# Patient Record
Sex: FEMALE | Race: WHITE | NOT HISPANIC OR LATINO | Employment: OTHER | URBAN - METROPOLITAN AREA
[De-identification: names, ages, dates, MRNs, and addresses within clinical notes are randomized per-mention and may not be internally consistent; named-entity substitution may affect disease eponyms.]

---

## 2024-09-19 ENCOUNTER — OFFICE VISIT (OUTPATIENT)
Dept: FAMILY MEDICINE | Facility: CLINIC | Age: 29
End: 2024-09-19
Payer: COMMERCIAL

## 2024-09-19 VITALS
HEIGHT: 67 IN | SYSTOLIC BLOOD PRESSURE: 106 MMHG | DIASTOLIC BLOOD PRESSURE: 72 MMHG | TEMPERATURE: 98.1 F | WEIGHT: 133 LBS | OXYGEN SATURATION: 100 % | RESPIRATION RATE: 10 BRPM | HEART RATE: 75 BPM | BODY MASS INDEX: 20.88 KG/M2

## 2024-09-19 DIAGNOSIS — B02.9 HERPES ZOSTER WITHOUT COMPLICATION: Primary | ICD-10-CM

## 2024-09-19 DIAGNOSIS — R21 RASH: ICD-10-CM

## 2024-09-19 PROCEDURE — 99213 OFFICE O/P EST LOW 20 MIN: CPT | Performed by: PHYSICIAN ASSISTANT

## 2024-09-19 RX ORDER — VALACYCLOVIR HYDROCHLORIDE 1 G/1
1000 TABLET, FILM COATED ORAL 3 TIMES DAILY
Qty: 21 TABLET | Refills: 0 | Status: SHIPPED | OUTPATIENT
Start: 2024-09-19 | End: 2024-09-26

## 2024-09-19 ASSESSMENT — PAIN SCALES - GENERAL: PAINLEVEL: MILD PAIN (2)

## 2024-09-19 NOTE — PROGRESS NOTES
Assessment & Plan     Herpes zoster without complication  Etiology likely shingles.  Other diagnoses discussed and considered are dyshidrotic eczema vs HFM vs other viral rash.  Due to lack of URI symptoms and significant arm pain which occurred before the rash, I suspect that this represents shingles and will treat as such. Return precautions given.  Pain managed with OCT medications discussed. Offered definitive testing but agreed that the utility in this case is limited and will not change initial management. She opted to forego skin culture at this time which is reasonable.   - valACYclovir (VALTREX) 1000 mg tablet; Take 1 tablet (1,000 mg) by mouth 3 times daily for 7 days.    Rash            See Patient Instructions    Subjective   Araceli is a 29 year old, presenting for the following health issues:  Derm Problem (Blisters on hands) and Arm Pain        9/19/2024     9:21 AM   Additional Questions   Roomed by Ly STRONG   Accompanied by Son     Arm Pain     Pt had onset intermittent sharp pain from right shoulder radiating down to right arm over the past 3 days. She has now developed blisters on hands. Shingles concern?       Rash  Onset/Duration: a few days ago  Description  Location: hands   Character: round, blotchy, raised, painful, red  Itching: no  Intensity:  moderate  Progression of Symptoms:  worsening  Accompanying signs and symptoms:   Fever: No  Body aches or joint pain: No  Sore throat symptoms: No  Recent cold symptoms: No  History:           Previous episodes of similar rash: None  New exposures:  None  Recent travel: No  Exposure to similar rash: No  Precipitating or alleviating factors: n/a  Therapies tried and outcome: none  Pain History:  When did you first notice your pain? A few days ago   Have you seen anyone else for your pain? No  How has your pain affected your ability to work? Not applicable  Where in your body do you have pain? Arm Pain   Onset/Duration:  a few days  "  Description  Location: arm - right  Joint Swelling: No  Redness: No  Pain: YES- sharp intermittent pain   Warmth: No  Intensity:  moderate  Progression of Symptoms:  intermittent  Accompanying signs and symptoms:   Fevers: No  Numbness/tingling/weakness: YES, down whole arm to hands  History  Trauma to the area: No  Recent illness:  No  Previous similar problem: No  Previous evaluation:  No  Precipitating or alleviating factors:  Aggravating factors include: lifting, overuse, and sleeping  Therapies tried and outcome: rest/inactivity and ice        Review of Systems  Constitutional, neuro, ENT, endocrine, pulmonary, cardiac, gastrointestinal, genitourinary, musculoskeletal, integument and psychiatric systems are negative, except as otherwise noted.      Objective    /72 (BP Location: Left arm, Patient Position: Sitting, Cuff Size: Adult Regular)   Pulse 75   Temp 98.1  F (36.7  C) (Tympanic)   Resp 10   Ht 1.69 m (5' 6.54\")   Wt 60.3 kg (133 lb)   LMP 09/04/2024 (Approximate)   SpO2 100%   BMI 21.12 kg/m    Body mass index is 21.12 kg/m .  Physical Exam   GENERAL: alert and no distress  SKIN:slightly erythematous papulovesicular lesions noted along palms and knuckles of right hand that are TTP            Signed Electronically by: Neo Jaquez PA-C    "

## 2025-02-17 ENCOUNTER — NURSE TRIAGE (OUTPATIENT)
Dept: FAMILY MEDICINE | Facility: CLINIC | Age: 30
End: 2025-02-17
Payer: COMMERCIAL

## 2025-02-17 NOTE — TELEPHONE ENCOUNTER
"Nurse Triage SBAR    Is this a 2nd Level Triage? YES, LICENSED PRACTITIONER REVIEW IS REQUIRED    Situation: Pt has had 2 episodes of severe epigastric within the last 2-3 weeks.     Background: Upper abdominal pain radiates to her back and has dry heaves. Pt denies pregnancy, vomiting or fever. She also denies chest pain or breathing problems.  Pt  describes pain as\" intense\" and is typically in the middle of night and pain last for 3 hours. Pt is asymptomatic during call. Pt is not established with [facility] but plans to establish care with a  provider.      Assessment: Patient needs to be evaluated.     Protocol Recommended Disposition:   See in Office Today     Recommendation: ADS huddle for appt tomorrow/ schedule establish care appt.      Does the patient meet one of the following criteria for ADS visit consideration? 16+ years old, with an [facility] PCP     TIP  Providers, please consider if this condition is appropriate for management at one of our Acute and Diagnostic Services sites.     If patient is a good candidate, please use dotphrase <dot>triageresponse and select Refer to ADS to document.          Reason for Disposition   Patient wants to be seen   MODERATE pain that comes and goes (cramps) lasts > 24 hours    Additional Information   Negative: SEVERE difficulty breathing (e.g., struggling for each breath, speaks in single words)   Negative: Shock suspected (e.g., cold/pale/clammy skin, too weak to stand, low BP, rapid pulse)   Negative: Difficult to awaken or acting confused (e.g., disoriented, slurred speech)   Negative: Passed out (e.g., fainted, lost consciousness, blacked out and was not responding)   Negative: Visible sweat on face or sweat is dripping down   Negative: Sounds like a life-threatening emergency to the triager   Negative: Followed an abdomen (stomach) injury   Negative: Chest pain   Negative: Abdominal pain and pregnant < 20 weeks   Negative: Abdominal pain and pregnant 20 or more " weeks   Negative: Abdomen bloating or swelling are main symptoms   Negative: SEVERE abdominal pain (e.g., excruciating)   Negative: Pain lasting > 10 minutes and over 50 years old   Negative: Pain lasting > 10 minutes and over 40 years old and associated chest, arm, neck, upper back, or jaw pain   Negative: Pain lasting > 10 minutes and over 35 years old and at least one cardiac risk factor (e.g., diabetes, high cholesterol, hypertension, obesity, smoker or strong family history of heart disease)   Negative: Pain lasting > 10 minutes and history of heart disease (i.e., heart attack, bypass surgery, angina, angioplasty, CHF)   Negative: Pain lasts > 10 minutes and difficulty breathing   Negative: Vomiting red blood or black (coffee ground) material  (Exception: Few streaks and only occurred once.)   Negative: Blood in bowel movements  (Exception: Blood on surface of BM with constipation.)   Negative: Black or tarry bowel movements  (Exception: Chronic-unchanged black-grey BMs AND is taking iron pills or Pepto-Bismol.)   Negative: Pregnant 20 weeks or more and new hand or face swelling   Negative: MILD TO MODERATE constant pain lasting > 2 hours   Negative: MILD TO MODERATE pain and not relieved by antacid medicine   Negative: Vomiting bile (green color)   Negative: Patient sounds very sick or weak to the triager   Negative: Vomiting and abdomen looks much more swollen than usual   Negative: White of the eyes have turned yellow (i.e., jaundice)   Negative: Fever > 103 F (39.4 C)   Negative: Fever > 101 F (38.3 C) and over 60 years of age   Negative: Fever > 100 F (37.8 C) and has diabetes mellitus or a weak immune system (e.g., HIV positive, cancer chemotherapy, organ transplant, splenectomy, chronic steroids)   Negative: Fever > 100 F (37.8 C) and bedridden (e.g., CVA, chronic illness, recovering from surgery)    Protocols used: Abdominal Pain - Upper-A-[place]

## 2025-02-17 NOTE — TELEPHONE ENCOUNTER
We were asked to consult on this patient case for acute diagnostic services    Patient presenting with epigastric discomfort , learns more towards right side but laying down reproduces discomfort; having muscle spasms intermittently throughout the night    She has not had any fevers.  No injuries to the area.  No pain with eating.  No history abdominal surgeries.    Denies any history of reflux or cardiac issues.  Denies any recent respiratory issues or cough.      Patient is moving primary care services from [facility] to our clinic due to an insurance change      At this present time she there are no clear indications she will need advanced imaging    I sent her an email enrollment for [facility] from there she can use the on my way system to be seen in urgent care if she chooses to be evaluated in person tonight.      No red flag symptoms identified at this time    [facility]

## 2025-02-20 ENCOUNTER — OFFICE VISIT (OUTPATIENT)
Dept: FAMILY MEDICINE | Facility: CLINIC | Age: 30
End: 2025-02-20
Payer: COMMERCIAL

## 2025-02-20 VITALS
BODY MASS INDEX: 21.98 KG/M2 | OXYGEN SATURATION: 100 % | RESPIRATION RATE: 14 BRPM | HEIGHT: 66 IN | TEMPERATURE: 97.4 F | WEIGHT: 136.8 LBS | HEART RATE: 72 BPM | SYSTOLIC BLOOD PRESSURE: 102 MMHG | DIASTOLIC BLOOD PRESSURE: 72 MMHG

## 2025-02-20 DIAGNOSIS — R11.2 NAUSEA AND VOMITING, UNSPECIFIED VOMITING TYPE: ICD-10-CM

## 2025-02-20 DIAGNOSIS — Z00.00 ROUTINE GENERAL MEDICAL EXAMINATION AT A HEALTH CARE FACILITY: Primary | ICD-10-CM

## 2025-02-20 DIAGNOSIS — F41.1 GAD (GENERALIZED ANXIETY DISORDER): ICD-10-CM

## 2025-02-20 DIAGNOSIS — J45.20 MILD INTERMITTENT ASTHMA WITHOUT COMPLICATION: ICD-10-CM

## 2025-02-20 DIAGNOSIS — Z13.220 ENCOUNTER FOR SCREENING FOR LIPID DISORDER: ICD-10-CM

## 2025-02-20 DIAGNOSIS — K21.9 GASTROESOPHAGEAL REFLUX DISEASE, UNSPECIFIED WHETHER ESOPHAGITIS PRESENT: ICD-10-CM

## 2025-02-20 DIAGNOSIS — R10.13 ABDOMINAL PAIN, EPIGASTRIC: ICD-10-CM

## 2025-02-20 LAB
ALBUMIN SERPL BCG-MCNC: 4.4 G/DL (ref 3.5–5.2)
ALP SERPL-CCNC: 66 U/L (ref 40–150)
ALT SERPL W P-5'-P-CCNC: 19 U/L (ref 0–50)
ANION GAP SERPL CALCULATED.3IONS-SCNC: 14 MMOL/L (ref 7–15)
AST SERPL W P-5'-P-CCNC: 23 U/L (ref 0–45)
BILIRUB SERPL-MCNC: 0.4 MG/DL
BUN SERPL-MCNC: 13.8 MG/DL (ref 6–20)
CALCIUM SERPL-MCNC: 9.7 MG/DL (ref 8.8–10.4)
CHLORIDE SERPL-SCNC: 103 MMOL/L (ref 98–107)
CHOLEST SERPL-MCNC: 180 MG/DL
CREAT SERPL-MCNC: 0.62 MG/DL (ref 0.51–0.95)
EGFRCR SERPLBLD CKD-EPI 2021: >90 ML/MIN/1.73M2
ERYTHROCYTE [DISTWIDTH] IN BLOOD BY AUTOMATED COUNT: 12.9 % (ref 10–15)
FASTING STATUS PATIENT QL REPORTED: ABNORMAL
FASTING STATUS PATIENT QL REPORTED: NORMAL
GLUCOSE SERPL-MCNC: 90 MG/DL (ref 70–99)
HCO3 SERPL-SCNC: 22 MMOL/L (ref 22–29)
HCT VFR BLD AUTO: 41.8 % (ref 35–47)
HDLC SERPL-MCNC: 61 MG/DL
HGB BLD-MCNC: 14 G/DL (ref 11.7–15.7)
LDLC SERPL CALC-MCNC: 108 MG/DL
LIPASE SERPL-CCNC: 24 U/L (ref 13–60)
MCH RBC QN AUTO: 29.4 PG (ref 26.5–33)
MCHC RBC AUTO-ENTMCNC: 33.5 G/DL (ref 31.5–36.5)
MCV RBC AUTO: 88 FL (ref 78–100)
NONHDLC SERPL-MCNC: 119 MG/DL
PLATELET # BLD AUTO: 224 10E3/UL (ref 150–450)
POTASSIUM SERPL-SCNC: 4.3 MMOL/L (ref 3.4–5.3)
PROT SERPL-MCNC: 7.8 G/DL (ref 6.4–8.3)
RBC # BLD AUTO: 4.76 10E6/UL (ref 3.8–5.2)
SODIUM SERPL-SCNC: 139 MMOL/L (ref 135–145)
TRIGL SERPL-MCNC: 53 MG/DL
TSH SERPL DL<=0.005 MIU/L-ACNC: 2.78 UIU/ML (ref 0.3–4.2)
WBC # BLD AUTO: 6.4 10E3/UL (ref 4–11)

## 2025-02-20 RX ORDER — ALBUTEROL SULFATE 90 UG/1
2 INHALANT RESPIRATORY (INHALATION) EVERY 6 HOURS PRN
Qty: 18 G | Refills: 1 | Status: SHIPPED | OUTPATIENT
Start: 2025-02-20

## 2025-02-20 RX ORDER — OMEPRAZOLE 20 MG/1
20 CAPSULE, DELAYED RELEASE ORAL DAILY
Qty: 90 CAPSULE | Refills: 1 | Status: SHIPPED | OUTPATIENT
Start: 2025-02-20

## 2025-02-20 RX ORDER — ONDANSETRON 4 MG/1
4 TABLET, FILM COATED ORAL EVERY 8 HOURS PRN
Qty: 30 TABLET | Refills: 5 | Status: SHIPPED | OUTPATIENT
Start: 2025-02-20

## 2025-02-20 SDOH — HEALTH STABILITY: PHYSICAL HEALTH: ON AVERAGE, HOW MANY DAYS PER WEEK DO YOU ENGAGE IN MODERATE TO STRENUOUS EXERCISE (LIKE A BRISK WALK)?: 5 DAYS

## 2025-02-20 SDOH — HEALTH STABILITY: PHYSICAL HEALTH: ON AVERAGE, HOW MANY MINUTES DO YOU ENGAGE IN EXERCISE AT THIS LEVEL?: 30 MIN

## 2025-02-20 ASSESSMENT — ENCOUNTER SYMPTOMS: ABDOMINAL PAIN: 1

## 2025-02-20 ASSESSMENT — PAIN SCALES - GENERAL: PAINLEVEL_OUTOF10: NO PAIN (0)

## 2025-02-20 ASSESSMENT — ASTHMA QUESTIONNAIRES: ACT_TOTALSCORE: 25

## 2025-02-20 ASSESSMENT — SOCIAL DETERMINANTS OF HEALTH (SDOH): HOW OFTEN DO YOU GET TOGETHER WITH FRIENDS OR RELATIVES?: THREE TIMES A WEEK

## 2025-02-20 NOTE — PROGRESS NOTES
Preventive Care Visit  [facility] [place] Paez MD, Internal Medicine  Feb 20, 2025        Assessment and Plan  1. Routine general medical examination at a health care facility (Primary)    Patient is new to me, last seen our group in September 2024 for herpes.  She is here for acute concerns of abdominal pain with intermittent stomachache for the past 3 weeks does have nausea and vomiting as she endorses.  Not on any medications on chart review, I do not see any abdominal imaging except past OB/GYN ultrasound.  She is opting to have this as annual physical today, will do as requested.    - REVIEW OF HEALTH MAINTENANCE PROTOCOL ORDERS  - Lipase; Future  - Comprehensive metabolic panel (BMP + Alb, Alk Phos, ALT, AST, Total. Bili, TP); Future  - TSH with free T4 reflex; Future  - CBC with platelets; Future  - Lipid panel reflex to direct LDL Fasting; Future  - PRIMARY CARE FOLLOW-UP SCHEDULING; Future  - Comprehensive metabolic panel (BMP + Alb, Alk Phos, ALT, AST, Total. Bili, TP)  - TSH with free T4 reflex  - CBC with platelets  - Lipid panel reflex to direct LDL Fasting    2. Gastroesophageal reflux disease, unspecified whether esophagitis present  New problem added to patient on list today as per the symptoms mentioned below with the physical exam most likely GERD.  Will treat empirically with omeprazole and check for H. pylori.  - omeprazole (PRILOSEC) 20 MG DR capsule; Take 1 capsule (20 mg) by mouth daily.  Dispense: 90 capsule; Refill: 1  - Helicobacter pylori Antigen Stool; Future  - Helicobacter pylori Antigen Stool    3. Abdominal pain, epigastric  Acute concerns of ongoing abdominal pain for past 3 weeks as mentioned in HPI.  Patient endorses that this has been as 3 episodes in last 3 weeks >> last episode of stomach pain yesterday night more of dry heaves >. Happening at night. Stays 3-4 hours for 7-10/10 in severity. Denies any loose stools/blood in stools - as well as  ,not tried any OTC antacids in past 3 weeks.   - Physical exam positive for discomfort on palpation of Epigastrium and hypoactive bowel sounds. Though no rigidity or guarding. Pt states her current abdominal pain is 2/10 and no nausea. She has tolerated her breakfast today well.   - Most part of the appointment have been discussing on the CT abdomen STAT needs at this time , explained ER precautions and shared decision to so the baseline work up which she did not have in the past and treat this as GERD as mentioned above.   - Will await for the lab results and pt to update me for any worsening symptoms as mentioned in AVS below.   - Lipase; Future  - Comprehensive metabolic panel (BMP + Alb, Alk Phos, ALT, AST, Total. Bili, TP); Future  - CBC with platelets; Future  - omeprazole (PRILOSEC) 20 MG DR capsule; Take 1 capsule (20 mg) by mouth daily.  Dispense: 90 capsule; Refill: 1  - Lipase  - Comprehensive metabolic panel (BMP + Alb, Alk Phos, ALT, AST, Total. Bili, TP)  - CBC with platelets    4. Nausea and vomiting, unspecified vomiting type  New problem which patient endorses has been bothering her as mentioned in the HPI below with sudden sharp intermittent abdominal pain as mentioned above.  Differential diagnosis of possible pancreatitis versus GERD, will give symptomatic therapy and do pertinent workup for this.  - Lipase; Future  - ondansetron (ZOFRAN) 4 MG tablet; Take 1 tablet (4 mg) by mouth every 8 hours as needed for nausea.  Dispense: 30 tablet; Refill: 5    5. VA (generalized anxiety disorder)  Chronic stable, intermittent flareups.  Not on any medications at this time, continue current lifestyle modifications.  Will check TSH and do further recommendations.  - TSH with free T4 reflex; Future  - TSH with free T4 reflex    6. Mild intermittent asthma without complication  - CBC with platelets; Future  - CBC with platelets  - albuterol (PROAIR HFA/PROVENTIL HFA/VENTOLIN HFA) 108 (90 Base) MCG/ACT  inhaler; Inhale 2 puffs into the lungs every 6 hours as needed for shortness of breath, wheezing or cough.  Dispense: 18 g; Refill: 1    7. Encounter for screening for lipid disorder  - Lipid panel reflex to direct LDL Fasting; Future  - Lipid panel reflex to direct LDL Fasting         Please note that this note consists of symbols derived from keyboarding, dictation and/or voice recognition software. As a result, there may be errors in the script that have gone undetected. Please consider this when interpreting information found in this chart.    Patient Instructions   As discussed, since your symptoms are pretty well controlled and happening only in nights - will make sure its not GERD    Will check for your pertinent lab work to make sure no pancreatic inflammation / gall bladder or liver issues causing this.     Started on omeprazole and as needed nausea medication for you     Please inform me immediately if any worsening abdominal pain - can plan     =========================================      Patient Education  Preventive Care Advice   This is general advice given by our system to help you stay healthy. However, your care team may have specific advice just for you. Please talk to your care team about your preventive care needs.  Nutrition  Eat 5 or more servings of fruits and vegetables each day.  Try wheat bread, brown rice and whole grain pasta (instead of white bread, rice, and pasta).  Get enough calcium and vitamin D. Check the label on foods and aim for 100% of the RDA (recommended daily allowance).  Lifestyle  Exercise at least 150 minutes each week  (30 minutes a day, 5 days a week).  Do muscle strengthening activities 2 days a week. These help control your weight and prevent disease.  No smoking.  Wear sunscreen to prevent skin cancer.  Have a dental exam and cleaning every 6 months.  Yearly exams  See your health care team every year to talk about:  Any changes in your health.  Any medicines your  care team has prescribed.  Preventive care, family planning, and ways to prevent chronic diseases.  Shots (vaccines)   HPV shots (up to age 26), if you've never had them before.  Hepatitis B shots (up to age 59), if you've never had them before.  COVID-19 shot: Get this shot when it's due.  Flu shot: Get a flu shot every year.  Tetanus shot: Get a tetanus shot every 10 years.  Pneumococcal, hepatitis A, and RSV shots: Ask your care team if you need these based on your risk.  Shingles shot (for age 50 and up)  General health tests  Diabetes screening:  Starting at age 35, Get screened for diabetes at least every 3 years.  If you are younger than age 35, ask your care team if you should be screened for diabetes.  Cholesterol test: At age 39, start having a cholesterol test every 5 years, or more often if advised.  Bone density scan (DEXA): At age 50, ask your care team if you should have this scan for osteoporosis (brittle bones).  Hepatitis C: Get tested at least once in your life.  STIs (sexually transmitted infections)  Before age 24: Ask your care team if you should be screened for STIs.  After age 24: Get screened for STIs if you're at risk. You are at risk for STIs (including HIV) if:  You are sexually active with more than one person.  You don't use condoms every time.  You or a partner was diagnosed with a sexually transmitted infection.  If you are at risk for HIV, ask about PrEP medicine to prevent HIV.  Get tested for HIV at least once in your life, whether you are at risk for HIV or not.  Cancer screening tests  Cervical cancer screening: If you have a cervix, begin getting regular cervical cancer screening tests starting at age 21.  Breast cancer scan (mammogram): If you've ever had breasts, begin having regular mammograms starting at age 40. This is a scan to check for breast cancer.  Colon cancer screening: It is important to start screening for colon cancer at age 45.  Have a colonoscopy test every 10  years (or more often if you're at risk) Or, ask your provider about stool tests like a FIT test every year or Cologuard test every 3 years.  To learn more about your testing options, visit:   .  For help making a decision, visit:   https://bit.ly/ez36786.  Prostate cancer screening test: If you have a prostate, ask your care team if a prostate cancer screening test (PSA) at age 55 is right for you.  Lung cancer screening: If you are a current or former smoker ages 50 to 80, ask your care team if ongoing lung cancer screenings are right for you.  For informational purposes only. Not to replace the advice of your health care provider. Copyright   2023 [employer]. All rights reserved. Clinically reviewed by the [facility]. [employer] [id number] - REV 01/24.     Return in about 2 months (around 4/20/2025), or if symptoms worsen or fail to improve, for If symptoms persist, Follow up of last visit, video visit.    Blessing Paez MD  [facility] [place] Charles is a 29 year old, presenting for the following:  Abdominal Pain (Lasts 3-4 hours, only at night, pain feels like giving birth, radiates to the back,has not tried pain management. ) and Physical        2/20/2025     9:40 AM   Additional Questions   Roomed by Arabella PAULSON          History of Present Illness       Reason for visit:  Intermittent stomach and back pain  Symptom onset:  3-4 weeks ago   She is taking medications regularly.    GERD/Stomach pain- Epigastric region   Onset/Duration: 3 weeks  Description: excruciating pain only at night  Intensity: severe  Progression of Symptoms: waxing and waning  Accompanying Signs & Symptoms:  Does it feel like food gets stuck or trouble swallowing: No  Nausea: YES  Vomiting : YES- bile vomiting   Abdominal Pain: YES  Black-Tarry stools: No  Bloody stools: No  History:  Previous similar episodes: No  Previous ulcers: No  Precipitating factors:    Caffeine use: YES  Alcohol use: N/A  NSAID/Aspirin use: No  Tobacco use: N/A  Worse with fatty foods and spicy foods.  Alleviating factors: Bedtime  Therapies tried and outcome:             Lifestyle changes: Eating healthy- no relief            Medications: none      Health Care Directive  Patient does not have a Health Care Directive: Discussed advance care planning with patient; however, patient declined at this time.      2/20/2025   General Health   How would you rate your overall physical health? Good   Feel stress (tense, anxious, or unable to sleep) Not at all         2/20/2025   Nutrition   Three or more servings of calcium each day? Yes   Diet: Regular (no restrictions)   How many servings of fruit and vegetables per day? (!) 2-3   How many sweetened beverages each day? 0-1         2/20/2025   Exercise   Days per week of moderate/strenous exercise 5 days   Average minutes spent exercising at this level 30 min         2/20/2025   Social Factors   Frequency of gathering with friends or relatives Three times a week   Worry food won't last until get money to buy more No   Food not last or not have enough money for food? No   Do you have housing? (Housing is defined as stable permanent housing and does not include staying ouside in a car, in a tent, in an abandoned building, in an overnight shelter, or couch-surfing.) Yes   Are you worried about losing your housing? No   Lack of transportation? No   Unable to get utilities (heat,electricity)? No         2/20/2025   Dental   Dentist two times every year? Yes            Today's PHQ-2 Score:       2/20/2025     9:28 AM   PHQ-2 ( 1999 [employer])   Q1: Little interest or pleasure in doing things 0   Q2: Feeling down, depressed or hopeless 0   PHQ-2 Score 0    Q1: Little interest or pleasure in doing things Not at all   Q2: Feeling down, depressed or hopeless Not at all   PHQ-2 Score 0       Patient-reported           2/20/2025   Substance Use   Alcohol more than  3/day or more than 7/wk No   Do you use any other substances recreationally? No     Social History     Tobacco Use    Smoking status: Never    Smokeless tobacco: Never   Vaping Use    Vaping status: Never Used          Mammogram Screening - Patient under 40 years of age: Routine Mammogram Screening not recommended.         2/20/2025   STI Screening   New sexual partner(s) since last STI/HIV test? No     History of abnormal Pap smear: No - age 30- 64 PAP with HPV every 5 years recommended             2/20/2025   Contraception/Family Planning   Questions about contraception or family planning No        Reviewed and updated as needed this visit by Provider   Tobacco  Allergies  Meds  Problems  Med Hx  Surg Hx  Fam Hx            History reviewed. No pertinent past medical history.  History reviewed. No pertinent surgical history.  OB History   No obstetric history on file.     Lab work is in process  Labs reviewed in EPIC  BP Readings from Last 3 Encounters:   02/20/25 102/72   09/19/24 106/72    Wt Readings from Last 3 Encounters:   02/20/25 62.1 kg (136 lb 12.8 oz)   09/19/24 60.3 kg (133 lb)                  Patient Active Problem List   Diagnosis    Asthma    VA (generalized anxiety disorder)     History reviewed. No pertinent surgical history.    Social History     Tobacco Use    Smoking status: Never    Smokeless tobacco: Never   Substance Use Topics    Alcohol use: Not on file     History reviewed. No pertinent family history.      Current Outpatient Medications   Medication Sig Dispense Refill    albuterol (PROAIR HFA/PROVENTIL HFA/VENTOLIN HFA) 108 (90 Base) MCG/ACT inhaler Inhale 2 puffs into the lungs every 6 hours as needed for shortness of breath, wheezing or cough. 18 g 1    omeprazole (PRILOSEC) 20 MG DR capsule Take 1 capsule (20 mg) by mouth daily. 90 capsule 1    ondansetron (ZOFRAN) 4 MG tablet Take 1 tablet (4 mg) by mouth every 8 hours as needed for nausea. 30 tablet 5     Allergies  "  Allergen Reactions    Penicillins Rash     No lab results found.       Review of Systems  Constitutional, HEENT, cardiovascular, pulmonary, GI, , musculoskeletal, neuro, skin, endocrine and psych systems are negative, except as otherwise noted.     Objective    Exam  /72   Pulse 72   Temp 97.4  F (36.3  C) (Temporal)   Resp 14   Ht 1.675 m (5' 5.95\")   Wt 62.1 kg (136 lb 12.8 oz)   LMP 01/25/2025 (Exact Date)   SpO2 100%   Breastfeeding No   BMI 22.12 kg/m     Estimated body mass index is 22.12 kg/m  as calculated from the following:    Height as of this encounter: 1.675 m (5' 5.95\").    Weight as of this encounter: 62.1 kg (136 lb 12.8 oz).    Physical Exam  GENERAL: alert and no distress  EYES: Eyes grossly normal to inspection, PERRL and conjunctivae and sclerae normal  HENT: ear canals and TM's normal, nose and mouth without ulcers or lesions  NECK: no adenopathy, no asymmetry, masses, or scars  RESP: lungs clear to auscultation - no rales, rhonchi or wheezes  BREAST: normal without masses, tenderness or nipple discharge and no palpable axillary masses or adenopathy  CV: regular rate and rhythm, normal S1 S2, no S3 or S4, no murmur, click or rub, no peripheral edema  ABDOMEN: soft, positive for discomfort on palpation of Epigastrium and hypoactive bowel sounds. Though no rigidity or guarding, no hepatosplenomegaly, no masses.  MS: no gross musculoskeletal defects noted, no edema  SKIN: no suspicious lesions or rashes  NEURO: Normal strength and tone, mentation intact and speech normal  PSYCH: mentation appears normal, affect normal/bright        Signed Electronically by: Blessing Paez MD    "

## 2025-02-20 NOTE — PATIENT INSTRUCTIONS
As discussed, since your symptoms are pretty well controlled and happening only in nights - will make sure its not GERD    Will check for your pertinent lab work to make sure no pancreatic inflammation / gall bladder or liver issues causing this.     Started on omeprazole and as needed nausea medication for you     Please inform me immediately if any worsening abdominal pain - can plan     =========================================      Patient Education   Preventive Care Advice   This is general advice given by our system to help you stay healthy. However, your care team may have specific advice just for you. Please talk to your care team about your preventive care needs.  Nutrition  Eat 5 or more servings of fruits and vegetables each day.  Try wheat bread, brown rice and whole grain pasta (instead of white bread, rice, and pasta).  Get enough calcium and vitamin D. Check the label on foods and aim for 100% of the RDA (recommended daily allowance).  Lifestyle  Exercise at least 150 minutes each week  (30 minutes a day, 5 days a week).  Do muscle strengthening activities 2 days a week. These help control your weight and prevent disease.  No smoking.  Wear sunscreen to prevent skin cancer.  Have a dental exam and cleaning every 6 months.  Yearly exams  See your health care team every year to talk about:  Any changes in your health.  Any medicines your care team has prescribed.  Preventive care, family planning, and ways to prevent chronic diseases.  Shots (vaccines)   HPV shots (up to age 26), if you've never had them before.  Hepatitis B shots (up to age 59), if you've never had them before.  COVID-19 shot: Get this shot when it's due.  Flu shot: Get a flu shot every year.  Tetanus shot: Get a tetanus shot every 10 years.  Pneumococcal, hepatitis A, and RSV shots: Ask your care team if you need these based on your risk.  Shingles shot (for age 50 and up)  General health tests  Diabetes screening:  Starting at age  35, Get screened for diabetes at least every 3 years.  If you are younger than age 35, ask your care team if you should be screened for diabetes.  Cholesterol test: At age 39, start having a cholesterol test every 5 years, or more often if advised.  Bone density scan (DEXA): At age 50, ask your care team if you should have this scan for osteoporosis (brittle bones).  Hepatitis C: Get tested at least once in your life.  STIs (sexually transmitted infections)  Before age 24: Ask your care team if you should be screened for STIs.  After age 24: Get screened for STIs if you're at risk. You are at risk for STIs (including HIV) if:  You are sexually active with more than one person.  You don't use condoms every time.  You or a partner was diagnosed with a sexually transmitted infection.  If you are at risk for HIV, ask about PrEP medicine to prevent HIV.  Get tested for HIV at least once in your life, whether you are at risk for HIV or not.  Cancer screening tests  Cervical cancer screening: If you have a cervix, begin getting regular cervical cancer screening tests starting at age 21.  Breast cancer scan (mammogram): If you've ever had breasts, begin having regular mammograms starting at age 40. This is a scan to check for breast cancer.  Colon cancer screening: It is important to start screening for colon cancer at age 45.  Have a colonoscopy test every 10 years (or more often if you're at risk) Or, ask your provider about stool tests like a FIT test every year or Cologuard test every 3 years.  To learn more about your testing options, visit:   .  For help making a decision, visit:   https://bit.ly/ol98578.  Prostate cancer screening test: If you have a prostate, ask your care team if a prostate cancer screening test (PSA) at age 55 is right for you.  Lung cancer screening: If you are a current or former smoker ages 50 to 80, ask your care team if ongoing lung cancer screenings are right for you.  For informational  purposes only. Not to replace the advice of your health care provider. Copyright   2023 [facility]. All rights reserved. Clinically reviewed by the [facility]. [employer] 122882 - REV 01/24.

## 2025-02-20 NOTE — PROGRESS NOTES
"  {PROVIDER CHARTING PREFERENCE:652872}    Tyson Charles is a 29 year old, presenting for the following health issues:  Abdominal Pain (Lasts 3-4 hours, only at night, pain feels like giving birth, radiates to the back,has not tried pain management. )        2/20/2025     9:40 AM   Additional Questions   Roomed by Arabella PAULSON     Abdominal Pain   This is a new problem. The problem occurs every several days. The pain is located in the epigastric region. The quality of the pain is dull.   History of Present Illness       Reason for visit:  Intermittent stomach and back pain  Symptom onset:  3-4 weeks ago   She is taking medications regularly.       {MA/LPN/RN Pre-Provider Visit Orders- hCG/UA/Strep (Optional):439161}  GERD/Stomach pain  Onset/Duration: 3 weeks  Description: excruciating pain only at night  Intensity: severe  Progression of Symptoms: waxing and waning  Accompanying Signs & Symptoms:  Does it feel like food gets stuck or trouble swallowing: No  Nausea: YES  Vomiting : YES- bile vomiting   Abdominal Pain: YES  Black-Tarry stools: No  Bloody stools: No  History:  Previous similar episodes: No  Previous ulcers: No  Precipitating factors:   Caffeine use: YES  Alcohol use: N/A  NSAID/Aspirin use: No  Tobacco use: N/A  Worse with fatty foods and spicy foods.  Alleviating factors: Bedtime  Therapies tried and outcome:             Lifestyle changes: Eating healthy- no relief            Medications: none  {additonal problems for provider to add (Optional):054406}    {ROS Picklists (Optional):038556}      Objective    /72   Pulse 72   Temp 97.4  F (36.3  C) (Temporal)   Resp 14   Ht 1.675 m (5' 5.95\")   Wt 62.1 kg (136 lb 12.8 oz)   LMP 01/25/2025 (Exact Date)   SpO2 100%   Breastfeeding No   BMI 22.12 kg/m    Body mass index is 22.12 kg/m .  Physical Exam   {Exam List (Optional):265576}    {Diagnostic Test Results (Optional):204967}        Signed Electronically by: Blessing Paez MD  {Email " feedback regarding this note to [email or web address]   :[id number]}

## 2025-02-21 ENCOUNTER — MYC MEDICAL ADVICE (OUTPATIENT)
Dept: FAMILY MEDICINE | Facility: CLINIC | Age: 30
End: 2025-02-21

## 2025-02-24 LAB — H PYLORI AG STL QL IA: NEGATIVE

## 2025-03-18 ENCOUNTER — MYC MEDICAL ADVICE (OUTPATIENT)
Dept: FAMILY MEDICINE | Facility: CLINIC | Age: 30
End: 2025-03-18
Payer: COMMERCIAL

## 2025-03-19 NOTE — TELEPHONE ENCOUNTER
Provider, please read the patient My Chart message and advise the triage staff.     Nancy Johnson RN  [facility]

## 2025-04-17 ENCOUNTER — ANCILLARY PROCEDURE (OUTPATIENT)
Dept: CT IMAGING | Facility: CLINIC | Age: 30
End: 2025-04-17
Attending: PHYSICIAN ASSISTANT
Payer: COMMERCIAL

## 2025-04-17 ENCOUNTER — NURSE TRIAGE (OUTPATIENT)
Dept: PEDIATRICS | Facility: CLINIC | Age: 30
End: 2025-04-17
Payer: COMMERCIAL

## 2025-04-17 ENCOUNTER — OFFICE VISIT (OUTPATIENT)
Dept: PEDIATRICS | Facility: CLINIC | Age: 30
End: 2025-04-17
Payer: COMMERCIAL

## 2025-04-17 VITALS
RESPIRATION RATE: 19 BRPM | SYSTOLIC BLOOD PRESSURE: 100 MMHG | BODY MASS INDEX: 21.38 KG/M2 | WEIGHT: 133 LBS | HEART RATE: 89 BPM | TEMPERATURE: 98.8 F | DIASTOLIC BLOOD PRESSURE: 65 MMHG | HEIGHT: 66 IN

## 2025-04-17 DIAGNOSIS — R11.2 NAUSEA AND VOMITING, UNSPECIFIED VOMITING TYPE: ICD-10-CM

## 2025-04-17 DIAGNOSIS — R10.13 EPIGASTRIC PAIN: Primary | ICD-10-CM

## 2025-04-17 DIAGNOSIS — R10.13 EPIGASTRIC PAIN: ICD-10-CM

## 2025-04-17 LAB
ALBUMIN SERPL-MCNC: 4.1 G/DL (ref 3.4–5)
ALBUMIN UR-MCNC: 30 MG/DL
ALP SERPL-CCNC: 61 U/L (ref 40–150)
ALT SERPL W P-5'-P-CCNC: 17 U/L (ref 0–50)
ANION GAP SERPL CALCULATED.3IONS-SCNC: <1 MMOL/L (ref 3–14)
APPEARANCE UR: CLEAR
AST SERPL W P-5'-P-CCNC: 28 U/L (ref 0–45)
BASOPHILS # BLD AUTO: 0 10E3/UL (ref 0–0.2)
BASOPHILS NFR BLD AUTO: 0 %
BILIRUB SERPL-MCNC: 0.9 MG/DL (ref 0.2–1.3)
BILIRUB UR QL STRIP: NEGATIVE
BUN SERPL-MCNC: 17 MG/DL (ref 7–30)
CALCIUM SERPL-MCNC: 9.5 MG/DL (ref 8.5–10.1)
CHLORIDE BLD-SCNC: 114 MMOL/L (ref 94–109)
CO2 SERPL-SCNC: 25 MMOL/L (ref 20–32)
COLOR UR AUTO: YELLOW
CREAT SERPL-MCNC: 0.6 MG/DL (ref 0.52–1.04)
EGFRCR SERPLBLD CKD-EPI 2021: >90 ML/MIN/1.73M2
EOSINOPHIL # BLD AUTO: 0 10E3/UL (ref 0–0.7)
EOSINOPHIL NFR BLD AUTO: 0 %
ERYTHROCYTE [DISTWIDTH] IN BLOOD BY AUTOMATED COUNT: 13.1 % (ref 10–15)
GLUCOSE BLD-MCNC: 105 MG/DL (ref 70–99)
GLUCOSE UR STRIP-MCNC: NEGATIVE MG/DL
HCG UR QL: NEGATIVE
HCT VFR BLD AUTO: 44.3 % (ref 35–47)
HGB BLD-MCNC: 14.3 G/DL (ref 11.7–15.7)
HGB UR QL STRIP: NEGATIVE
IMM GRANULOCYTES # BLD: 0 10E3/UL
IMM GRANULOCYTES NFR BLD: 0 %
KETONES UR STRIP-MCNC: NEGATIVE MG/DL
LEUKOCYTE ESTERASE UR QL STRIP: NEGATIVE
LIPASE SERPL-CCNC: 19 U/L (ref 13–60)
LYMPHOCYTES # BLD AUTO: 0.6 10E3/UL (ref 0.8–5.3)
LYMPHOCYTES NFR BLD AUTO: 6 %
MCH RBC QN AUTO: 28.5 PG (ref 26.5–33)
MCHC RBC AUTO-ENTMCNC: 32.3 G/DL (ref 31.5–36.5)
MCV RBC AUTO: 88 FL (ref 78–100)
MONOCYTES # BLD AUTO: 0.4 10E3/UL (ref 0–1.3)
MONOCYTES NFR BLD AUTO: 4 %
NEUTROPHILS # BLD AUTO: 8.5 10E3/UL (ref 1.6–8.3)
NEUTROPHILS NFR BLD AUTO: 90 %
NITRATE UR QL: NEGATIVE
PH UR STRIP: 7 [PH] (ref 5–7)
PLATELET # BLD AUTO: 218 10E3/UL (ref 150–450)
POTASSIUM BLD-SCNC: 4.3 MMOL/L (ref 3.4–5.3)
PROT SERPL-MCNC: 7.8 G/DL (ref 6.8–8.8)
RBC # BLD AUTO: 5.02 10E6/UL (ref 3.8–5.2)
RBC #/AREA URNS AUTO: NORMAL /HPF
SODIUM SERPL-SCNC: 139 MMOL/L (ref 135–145)
SP GR UR STRIP: 1.02 (ref 1–1.03)
UROBILINOGEN UR STRIP-ACNC: 0.2 E.U./DL
WBC # BLD AUTO: 9.5 10E3/UL (ref 4–11)
WBC #/AREA URNS AUTO: NORMAL /HPF

## 2025-04-17 PROCEDURE — 250N000011 HC RX IP 250 OP 636: Performed by: PHYSICIAN ASSISTANT

## 2025-04-17 PROCEDURE — 74177 CT ABD & PELVIS W/CONTRAST: CPT

## 2025-04-17 PROCEDURE — 250N000009 HC RX 250: Performed by: PHYSICIAN ASSISTANT

## 2025-04-17 RX ORDER — IOPAMIDOL 755 MG/ML
64 INJECTION, SOLUTION INTRAVASCULAR ONCE
Status: COMPLETED | OUTPATIENT
Start: 2025-04-17 | End: 2025-04-17

## 2025-04-17 RX ORDER — ONDANSETRON 2 MG/ML
4 INJECTION INTRAMUSCULAR; INTRAVENOUS
Status: ACTIVE | OUTPATIENT
Start: 2025-04-17 | End: 2025-04-18

## 2025-04-17 RX ORDER — SUCRALFATE 1 G/1
1 TABLET ORAL 4 TIMES DAILY
Qty: 40 TABLET | Refills: 1 | Status: SHIPPED | OUTPATIENT
Start: 2025-04-17

## 2025-04-17 RX ORDER — ONDANSETRON 4 MG/1
4 TABLET, ORALLY DISINTEGRATING ORAL EVERY 8 HOURS PRN
Qty: 12 TABLET | Refills: 0 | Status: SHIPPED | OUTPATIENT
Start: 2025-04-17

## 2025-04-17 RX ADMIN — SODIUM CHLORIDE 40 ML: 9 INJECTION, SOLUTION INTRAVENOUS at 10:47

## 2025-04-17 RX ADMIN — IOPAMIDOL 64 ML: 755 INJECTION, SOLUTION INTRAVENOUS at 10:48

## 2025-04-17 RX ADMIN — Medication 1000 ML: at 11:03

## 2025-04-17 RX ADMIN — ONDANSETRON 4 MG: 2 INJECTION INTRAMUSCULAR; INTRAVENOUS at 10:58

## 2025-04-17 NOTE — TELEPHONE ENCOUNTER
Scheduled with ADS.    Appointments in Next Year      Apr 17, 2025 10:00 AM  Diagnostic Visit with CS ADS PROVIDER  [facility] ([facility] ) [phone]         Jhon Peña RN  [facility]

## 2025-04-17 NOTE — PROGRESS NOTES
Assessment/Plan:    Labs generally unremarkable. CT abd/pelvis shows:  1.  Mild wall thickening is seen along the rectum and sigmoid colon which may be due to underdistention. Proctocolitis is also in the differential diagnosis.  2.  Small volume pelvic free fluid, likely physiologic.    Patient's pain is epigastric and symptoms are not consistent with proctocolitils, so I feel the findings of mild wall thickening in rectum and colon are likely due to underdistention.   Pt was given 1 L of normal saline, IV Zofran, and GI cocktail with moderate relief of symptoms. Tachycardia has resolved.  Suspect symptoms due to gastritis vs PUD. Due to severity of symptoms, would advise endoscopy. Note sent to PCP to see if they can order this, as they had already mentioned doing so if the patient was not improving.   Continue omeprazole, add sucralfate, also Rx more Zofran for nausea PRN. Continue to follow gastritis/GERD friendly diet.     See patient instructions below.    At the end of the encounter, I discussed results, diagnosis, medications. Discussed red flags for immediate return to clinic/ER, as well as indications for follow up if no improvement. Patient understood and agreed to plan. Patient was stable for discharge.    46 minutes was spent preparing for the visit and reviewing the patient's chart; getting a history and performing an exam; counseling and providing education to the patient, family, or caregiver; ordering medicines and/or tests; communicating with other healthcare professionals; documenting information in the medical record; interpreting results and sharing that information with the patient, family, or caregiver; and care coordination.       ICD-10-CM    1. Epigastric pain  R10.13 sodium chloride (PF) 0.9% PF flush 3 mL     CT Abdomen Pelvis w Contrast     UA with Microscopic reflex to Culture     HCG qualitative urine     Lipase     Comprehensive metabolic panel     CBC with platelets differential      CBC with platelets differential     Comprehensive metabolic panel     Lipase     HCG qualitative urine     UA with Microscopic reflex to Culture     UA Microscopic with Reflex to Culture     lidocaine (viscous) (XYLOCAINE) 2 % 15 mL, alum & mag hydroxide-simethicone (MAALOX) 15 mL GI Cocktail     sucralfate (CARAFATE) 1 GM tablet      2. Nausea and vomiting, unspecified vomiting type  R11.2 ondansetron (ZOFRAN) injection 4 mg     sodium chloride 0.9% BOLUS 1,000 mL     sucralfate (CARAFATE) 1 GM tablet     ondansetron (ZOFRAN ODT) 4 MG ODT tab            Return in about 1 week (around 4/24/2025) for Recheck with primary care provider.    MESERET Mcghee, PA-[facility]  -----------------------------------------------------------------------------------------------------------------------------------------------------    HPI:  Araceli Fisher is a 29 year old female who presents for evaluation of the following:    Abdominal/Flank Pain  Onset/Duration: Midnight  Description:   Character: Sharp  Location: angelica-umbilical region  Radiation: Back  Intensity: moderate, severe  Progression of Symptoms:  worsening  Accompanying Signs & Symptoms:  Fever/chills: YES  Gas/Bloating: YES  Nausea: YES  Vomitting: YES  Diarrhea: YES  Constipation:no   Dysuria: no            Hematuria: no            Frequency: no            Incontinence of urine: no   History:            Last bowel movement: today  Trauma: no   Previous similar pain: YES, not as severe back in February, had labs done that were normal at that time  Previous tests done: none           Previous Abdominal surgery: no   Precipitating factors:   Does the pain change with:     Food: no      Bowel Movement: no     Urination: no              Other factors: no   Therapies tried and outcome:  Omeprazole     When food last eaten: Last night    She had a physical 2/20/25 and discussed intermittent epigastric pain which had started about 1  "month prior. Pain was only at night, lasted 3-4 hours, and radiated into the back. She reported associated nausea and vomiting with it. It was worse with fatty & spicy foods. It was felt she had gastritis vs PUD. Labs were done (CMP, CBC, lipase, H. pylori) and were normal. She was started on omeprazole and Zofran. She was advised to take omeprazole for 3 months and follow up in April. Her PCP recommended endoscopy if not improving.    She is on the omeprazole and her symptoms had been better but she was out of town recently and not following her GERD friendly diet as well. She also had a glass of wine last night.  Now, she is experiencing worse pain which started int he middle of the night along with vomiting. This pain is lasting longer than usual and is more severe than what she has had in the past.    Denies hematemesis or melena.     No past medical history on file.    Vitals:    04/17/25 1008 04/17/25 1152   BP: 102/71 100/65   BP Location: Left arm Right arm   Patient Position: Sitting Sitting   Cuff Size: Adult Regular Adult Regular   Pulse: (!) 124 89   Resp: 19    Temp: 98.8  F (37.1  C)    Weight: 60.3 kg (133 lb)    Height: 1.675 m (5' 5.95\")        Physical Exam  Vitals and nursing note reviewed.   Constitutional:       Comments: Appears uncomfortable, tearful   Cardiovascular:      Rate and Rhythm: Regular rhythm. Tachycardia present.   Pulmonary:      Effort: Pulmonary effort is normal.   Abdominal:      General: Bowel sounds are normal.      Palpations: Abdomen is soft.      Tenderness: There is abdominal tenderness in the epigastric area. There is no guarding or rebound.   Neurological:      Mental Status: She is alert.         Labs/Imaging:  Results for orders placed or performed in visit on 04/17/25 (from the past 24 hours)   CBC with platelets differential    Narrative    The following orders were created for panel order CBC with platelets differential.  Procedure                               " Abnormality         Status                     ---------                               -----------         ------                     CBC with platelets and ...[[id number]]  Abnormal            Final result                 Please view results for these tests on the individual orders.   Comprehensive metabolic panel   Result Value Ref Range    Sodium 139 135 - 145 mmol/L    Potassium 4.3 3.4 - 5.3 mmol/L    Chloride 114 (H) 94 - 109 mmol/L    Carbon Dioxide (CO2) 25 20 - 32 mmol/L    Anion Gap <1 (L) 3 - 14 mmol/L    Urea Nitrogen 17 7 - 30 mg/dL    Creatinine 0.60 0.52 - 1.04 mg/dL    GFR Estimate >90 >60 mL/min/1.73m2    Calcium 9.5 8.5 - 10.1 mg/dL    Glucose 105 (H) 70 - 99 mg/dL    Alkaline Phosphatase 61 40 - 150 U/L    AST 28 0 - 45 U/L    ALT 17 0 - 50 U/L    Protein Total 7.8 6.8 - 8.8 g/dL    Albumin 4.1 3.4 - 5.0 g/dL    Bilirubin Total 0.9 0.2 - 1.3 mg/dL   Lipase   Result Value Ref Range    Lipase 19 13 - 60 U/L   CBC with platelets and differential   Result Value Ref Range    WBC Count 9.5 4.0 - 11.0 10e3/uL    RBC Count 5.02 3.80 - 5.20 10e6/uL    Hemoglobin 14.3 11.7 - 15.7 g/dL    Hematocrit 44.3 35.0 - 47.0 %    MCV 88 78 - 100 fL    MCH 28.5 26.5 - 33.0 pg    MCHC 32.3 31.5 - 36.5 g/dL    RDW 13.1 10.0 - 15.0 %    Platelet Count 218 150 - 450 10e3/uL    % Neutrophils 90 %    % Lymphocytes 6 %    % Monocytes 4 %    % Eosinophils 0 %    % Basophils 0 %    % Immature Granulocytes 0 %    Absolute Neutrophils 8.5 (H) 1.6 - 8.3 10e3/uL    Absolute Lymphocytes 0.6 (L) 0.8 - 5.3 10e3/uL    Absolute Monocytes 0.4 0.0 - 1.3 10e3/uL    Absolute Eosinophils 0.0 0.0 - 0.7 10e3/uL    Absolute Basophils 0.0 0.0 - 0.2 10e3/uL    Absolute Immature Granulocytes 0.0 <=0.4 10e3/uL   HCG qualitative urine   Result Value Ref Range    hCG Urine Qualitative Negative Negative   UA with Microscopic reflex to Culture    Specimen: Urine, Midstream   Result Value Ref Range    Color Urine Yellow Colorless, Straw, Light Yellow,  Yellow    Appearance Urine Clear Clear    Glucose Urine Negative Negative mg/dL    Bilirubin Urine Negative Negative    Ketones Urine Negative Negative mg/dL    Specific Gravity Urine 1.020 1.003 - 1.035    Blood Urine Negative Negative    pH Urine 7.0 5.0 - 7.0    Protein Albumin Urine 30 (A) Negative mg/dL    Urobilinogen Urine 0.2 0.2, 1.0 E.U./dL    Nitrite Urine Negative Negative    Leukocyte Esterase Urine Negative Negative   UA Microscopic with Reflex to Culture   Result Value Ref Range    RBC Urine None Seen 0-2 /HPF /HPF    WBC Urine None Seen 0-5 /HPF /HPF    Narrative    Urine Culture not indicated     No results found for this or any previous visit (from the past 24 hours).    Results for orders placed or performed in visit on 04/17/25   CT Abdomen Pelvis w Contrast     Status: None    Narrative    EXAM: CT ABDOMEN PELVIS W CONTRAST  LOCATION: [facility]  DATE: 4/17/2025    INDICATION: epigastric pain, N V x few months but acutely worse since last night.radiates into back  COMPARISON: None.  TECHNIQUE: CT scan of the abdomen and pelvis was performed following injection of IV contrast. Multiplanar reformats were obtained. Dose reduction techniques were used.  CONTRAST: 64ml isovue 370    FINDINGS:   LOWER CHEST: Unremarkable.    HEPATOBILIARY: No significant mass or bile duct dilatation. No calcified gallstones.     PANCREAS: No significant mass, duct dilatation, or inflammatory change.    SPLEEN: Normal size.    ADRENAL GLANDS: No significant nodules.    KIDNEYS/BLADDER: No significant mass, stone, or hydronephrosis.    BOWEL: No obstruction or inflammatory change. Mild wall thickening is seen along the rectum and sigmoid colon.    LYMPH NODES: No lymphadenopathy.    VASCULATURE: No abdominal aortic aneurysm.    PELVIC ORGANS: Intrauterine device is seen within the endometrium. Small volume pelvic free fluid is present.    MUSCULOSKELETAL: No suspicious osseous lesions are  seen.      Impression    IMPRESSION:   1.  Mild wall thickening is seen along the rectum and sigmoid colon which may be due to underdistention. Proctocolitis is also in the differential diagnosis.  2.  Small volume pelvic free fluid, likely physiologic.   Results for orders placed or performed in visit on 04/17/25   Comprehensive metabolic panel     Status: Abnormal   Result Value Ref Range    Sodium 139 135 - 145 mmol/L    Potassium 4.3 3.4 - 5.3 mmol/L    Chloride 114 (H) 94 - 109 mmol/L    Carbon Dioxide (CO2) 25 20 - 32 mmol/L    Anion Gap <1 (L) 3 - 14 mmol/L    Urea Nitrogen 17 7 - 30 mg/dL    Creatinine 0.60 0.52 - 1.04 mg/dL    GFR Estimate >90 >60 mL/min/1.73m2    Calcium 9.5 8.5 - 10.1 mg/dL    Glucose 105 (H) 70 - 99 mg/dL    Alkaline Phosphatase 61 40 - 150 U/L    AST 28 0 - 45 U/L    ALT 17 0 - 50 U/L    Protein Total 7.8 6.8 - 8.8 g/dL    Albumin 4.1 3.4 - 5.0 g/dL    Bilirubin Total 0.9 0.2 - 1.3 mg/dL   Lipase     Status: Normal   Result Value Ref Range    Lipase 19 13 - 60 U/L   HCG qualitative urine     Status: Normal   Result Value Ref Range    hCG Urine Qualitative Negative Negative   UA with Microscopic reflex to Culture     Status: Abnormal    Specimen: Urine, Midstream   Result Value Ref Range    Color Urine Yellow Colorless, Straw, Light Yellow, Yellow    Appearance Urine Clear Clear    Glucose Urine Negative Negative mg/dL    Bilirubin Urine Negative Negative    Ketones Urine Negative Negative mg/dL    Specific Gravity Urine 1.020 1.003 - 1.035    Blood Urine Negative Negative    pH Urine 7.0 5.0 - 7.0    Protein Albumin Urine 30 (A) Negative mg/dL    Urobilinogen Urine 0.2 0.2, 1.0 E.U./dL    Nitrite Urine Negative Negative    Leukocyte Esterase Urine Negative Negative   CBC with platelets and differential     Status: Abnormal   Result Value Ref Range    WBC Count 9.5 4.0 - 11.0 10e3/uL    RBC Count 5.02 3.80 - 5.20 10e6/uL    Hemoglobin 14.3 11.7 - 15.7 g/dL    Hematocrit 44.3 35.0 - 47.0  %    MCV 88 78 - 100 fL    MCH 28.5 26.5 - 33.0 pg    MCHC 32.3 31.5 - 36.5 g/dL    RDW 13.1 10.0 - 15.0 %    Platelet Count 218 150 - 450 10e3/uL    % Neutrophils 90 %    % Lymphocytes 6 %    % Monocytes 4 %    % Eosinophils 0 %    % Basophils 0 %    % Immature Granulocytes 0 %    Absolute Neutrophils 8.5 (H) 1.6 - 8.3 10e3/uL    Absolute Lymphocytes 0.6 (L) 0.8 - 5.3 10e3/uL    Absolute Monocytes 0.4 0.0 - 1.3 10e3/uL    Absolute Eosinophils 0.0 0.0 - 0.7 10e3/uL    Absolute Basophils 0.0 0.0 - 0.2 10e3/uL    Absolute Immature Granulocytes 0.0 <=0.4 10e3/uL   UA Microscopic with Reflex to Culture     Status: Normal   Result Value Ref Range    RBC Urine None Seen 0-2 /HPF /HPF    WBC Urine None Seen 0-5 /HPF /HPF    Narrative    Urine Culture not indicated   CBC with platelets differential     Status: Abnormal    Narrative    The following orders were created for panel order CBC with platelets differential.  Procedure                               Abnormality         Status                     ---------                               -----------         ------                     CBC with platelets and ...[[id number]]  Abnormal            Final result                 Please view results for these tests on the individual orders.       Patient Instructions   I will send a note to Dr. Paez about ordering endoscopy to evaluate for an ulcer/gastritis.   Continue omeprazole, we will add sucralfate. May also use Zofran for nausea, and try Pepto Bismol/Tums as needed.   Avoid ibuprofen, alcohol, and other dietary recommendations below.     Gastritis    Gastritis is inflammation and irritation of the stomach lining. You can have it for a short time (acute) or be long lasting (chronic). Infection with bacteria called H pylori most often causes gastritis. More than a third of people in the [place] have these bacteria in their bodies. In many cases, H pylori causes no problems or symptoms. In some people, though, the infection  irritates the stomach lining and causes gastritis. H. pylori may be diagnosed through blood, stool, or breath tests, we well as through biopsy during an endoscopy. Other causes of stomach irritation include drinking alcohol, smoking or chewing tobacco, or taking pain-relieving medicines called NSAIDs (such as aspirin or ibuprofen). Certain drugs (such as cocaine) and immune conditions can also cause gastritis.  Symptoms of gastritis can include:  Belly pain or bloating  Feeling full quickly  Loss of appetite  Nausea or vomiting  Vomiting blood or having black stools  Feeling more tired than usual  An inflamed and irritated stomach lining is more likely to develop a sore called an ulcer. To help prevent this, gastritis should be treated.  Home care  Lifestyle changes can help reduce symptoms. If needed, your healthcare provider may prescribe medicines. Symptoms often improve with treatment, but if treatment is stopped, the symptoms often return after a few months. So most persons with GERD will need to continue treatment or get treatment on and off.  Lifestyle changes  Drinking six to eight glasses of water daily and eating high-fiber, low-fat foods (for example, fruits, vegetables, and whole-grain breads and cereals) are recommended. Drinking carbonated beverages, alcohol, and caffeinated beverages; eating high-fat and spicy foods; and smoking are discouraged. Some foods, such as beans, cabbage, and cauliflower, naturally produce gas and should be limited or avoided. Meals that are small, regular, and frequent are encouraged because they are easier to digest than large ones.   Don t eat large meals, especially at night. Frequent, smaller meals are best. Don't lie down right after eating. And don t eat anything 3 hours before going to bed.  Don't drink alcohol or smoke. As much as possible, stay away from second hand smoke.  If you are overweight, losing weight will reduce symptoms.   Don't wear tight clothing around  "your stomach area.  If your symptoms occur during sleep, use a foam wedge to elevate your upper body (not just your head.) Or, place 4\" blocks under the head of your bed. Or use 2 bed risers under your bedframe.  Medicines  If needed, medicines can help relieve the symptoms of GERD and prevent damage to the esophagus. Discuss a medicine plan with your healthcare provider. This may include one or more of the following medicines:  Antacids to help neutralize the normal acids in your stomach.  Acid blockers (Histamine or H2 blockers) to decrease acid production.  Acid inhibitors (proton pump inhibitors PPIs) to decrease acid production in a different way than the blockers. They may work better, but can take a little longer to take effect.  Take an antacid 30 to 60 minutes after eating and at bedtime, but not at the same time as an acid blocker.  Try not to take medicines such as ibuprofen and aspirin. If you are taking aspirin for your heart or other medical reasons, talk to your healthcare provider about stopping it.If needed, our healthcare provider may prescribe medicines. If you have H pylori infection, treating it will likely relieve your symptoms. Other changes can help reduce stomach irritation and help it heal.  If you have been prescribed medicines for H pylori infection, take them as directed. Take all of the medicine until it is finished or your healthcare provider tells you to stop, even if you feel better.  Follow-up care  Follow up with your healthcare provider, or as advised by our staff. You may need testing to check for inflammation or an ulcer.  When to seek medical advice  Call your healthcare provider for any of the following:  Stomach pain that gets worse or moves to the lower right belly (appendix area)  Chest pain that appears or gets worse, or spreads to the back, neck, shoulder, or arm  Frequent vomiting (can t keep down liquids)  Blood in the stool or vomit (red or black in color)  Feeling " weak or dizzy  Shortness of breath  Unexplained weight loss  Fever of 100.4 F (38 C) or higher, or as directed by your healthcare provider

## 2025-04-17 NOTE — PROGRESS NOTES
Acute [facility] Diagnostic Services Clinic Visit    {PROVIDER CHARTING PREFERENCE:811321}    Tyson Charles is a 29 year old, presenting for the following health issues:  No chief complaint on file.    HPI      Abdominal/Flank Pain  Onset/Duration: Midnight  Description:   Character: Sharp  Location: angelica-umbilical region  Radiation: Back  Intensity: moderate, severe  Progression of Symptoms:  worsening  Accompanying Signs & Symptoms:  Fever/chills: YES  Gas/Bloating: YES  Nausea: YES  Vomitting: YES  Diarrhea: YES  Constipation:no   Dysuria: no            Hematuria: no            Frequency: no            Incontinence of urine: no   History:            Last bowel movement: today  Trauma: no   Previous similar pain: YES, not as severe back in February, had labs done that were normal at that time  Previous tests done: none           Previous Abdominal surgery: no   Precipitating factors:   Does the pain change with:     Food: no      Bowel Movement: no     Urination: no              Other factors: no   Therapies tried and outcome:  Omeprazole    When food last eaten: Last night      {ROS Picklists (Optional):237476}      Objective    LMP 01/25/2025 (Exact Date)   There is no height or weight on file to calculate BMI.  Physical Exam   {Exam List (Optional):339776}    {Diagnostic Test Results (Optional):757653}        Signed Electronically by: Fabiola Castaneda PA-C  {Email feedback regarding this note to [email or web address]   :[id number]}

## 2025-04-17 NOTE — TELEPHONE ENCOUNTER
"Nurse Triage SBAR    Is this a 2nd Level Triage? NO    Situation: patient reporting episodes of emesis and abdominal pain    Background: states last night at midnight started having some abdominal pain she attributed to gas. States then at 2 AM the abdominal pain became worse and she started having episodes of emesis. States she has a history of on and off abdominal pain that she has been seen for and is being treated for an ulcer, is on omeprazole and special diet. Denies other known causes, wondering if this is the flu aggravating the ulcer. Denies pregnancy, has an IUD. States ulcer symptoms had been better recently prior to recent travel as she wasn't on her specific diet.     Assessment: states the abdominal pain is above bellybutton in the center, a little to the right at times. States can radiate to equal part on her back. Denies chest pain and difficulty breathing. States has been unable to keep fluids down, having episodes of emesis and dry heaving every 2 hours. Denies blood in emesis or stool. States previously would have \"attacks\" of pain with ulcer that last 3 hours with some nausea, and then went away, but this is worse then those attacks though the same type of pain.    Protocol Recommended Disposition:   Call [facility]/Go to ED/UCC Now (Or To Office with PCP Approval), Go To ED/UCC Now (Or To Office With PCP Approval)    Recommendation: per protocol, advised patient be seen at ED as [facility] does not open until 9 AM and would take time to get her in. Advised UC not appropriate for imaging. States she is agreeable to ED but her  may not be able to take her until 9 AM. Advised if unable to be seen at ED prior to 9 AM then to call clinic back and inquire about [facility]. Patient stated her mom may be able to take her to the ED now. Advised patient to call mother and discuss, and if cannot be taken in now, proceed as above. Instructed patient to call clinic for new or worsening symptoms or further questions. " Patient verbalized understanding and agrees with the plan.      Does the patient meet one of the following criteria for [facility] visit consideration? 16+ years old, with an [facility] PCP     TIP  Providers, please consider if this condition is appropriate for management at one of our Acute and Diagnostic Services sites.     If patient is a good candidate, please use dotphrase <dot>triageresponse and select Refer to [facility] to document.      Reason for Disposition   MILD TO MODERATE constant pain lasting > 2 hours   Constant abdominal pain lasting > 2 hours    Additional Information   Negative: SEVERE difficulty breathing (e.g., struggling for each breath, speaks in single words)   Negative: Shock suspected (e.g., cold/pale/clammy skin, too weak to stand, low BP, rapid pulse)   Negative: Difficult to awaken or acting confused (e.g., disoriented, slurred speech)   Negative: Passed out (e.g., fainted, lost consciousness, blacked out and was not responding)   Negative: Visible sweat on face or sweat is dripping down   Negative: Sounds like a life-threatening emergency to the triager   Negative: Followed an abdomen (stomach) injury   Negative: Chest pain   Negative: Abdominal pain and pregnant < 20 weeks   Negative: Abdominal pain and pregnant 20 or more weeks   Negative: Abdomen bloating or swelling are main symptoms   Negative: SEVERE abdominal pain (e.g., excruciating)   Negative: Pain lasting > 10 minutes and over 50 years old   Negative: Pain lasting > 10 minutes and over 40 years old and associated chest, arm, neck, upper back, or jaw pain   Negative: Pain lasting > 10 minutes and over 35 years old and at least one cardiac risk factor (e.g., diabetes, high cholesterol, hypertension, obesity, smoker or strong family history of heart disease)   Negative: Pain lasting > 10 minutes and history of heart disease (i.e., heart attack, bypass surgery, angina, angioplasty, CHF)   Negative: Pain lasts > 10 minutes and difficulty  "breathing   Negative: Vomiting red blood or black (coffee ground) material  (Exception: Few streaks and only occurred once.)   Negative: Blood in bowel movements  (Exception: Blood on surface of BM with constipation.)   Negative: Black or tarry bowel movements  (Exception: Chronic-unchanged black-grey BMs AND is taking iron pills or Pepto-Bismol.)   Negative: Pregnant 20 weeks or more and new hand or face swelling   Negative: Shock suspected (e.g., cold/pale/clammy skin, too weak to stand, low BP, rapid pulse)   Negative: Difficult to awaken or acting confused (e.g., disoriented, slurred speech)   Negative: Sounds like a life-threatening emergency to the triager   Negative: Vomiting occurs only while coughing   Negative: Pregnant < 20 Weeks and nausea/vomiting began in early pregnancy (i.e., 4-8 weeks pregnant)   Negative: Chest pain   Negative: Headache is main symptom   Negative: Vomiting red blood or black (coffee ground) material   Negative: Vomiting and hernia is more painful or swollen than usual   Negative: Recent head injury (within 3 days)   Negative: Recent abdominal injury (within 7 days)   Negative: Insulin-dependent diabetes and glucose > 240 mg/dL (13 mmol/L)   Negative: Severe pain in one eye   Negative: SEVERE vomiting (e.g., 6 or more times/day)  (Exception: Patient sounds well, is drinking liquids, does not sound dehydrated, and vomiting has lasted less than 24 hours.)   Negative: MODERATE vomiting (e.g., 3 - 5 times/day) and age > 60 years    Answer Assessment - Initial Assessment Questions  1. LOCATION: \"Where does it hurt?\"       Upper abdomen center above [facility], a little right  2. RADIATION: \"Does the pain shoot anywhere else?\" (e.g., chest, back)      Travels to back  3. ONSET: \"When did the pain begin?\" (e.g., minutes, hours or days ago)       Ulcer pain on and off for months, being treated with omep and diet  4. SUDDEN: \"Gradual or sudden onset?\"      Felt like gas pain and worsened, " "gradual  5. PATTERN \"Does the pain come and go, or is it constant?\"      Today pain is constant   6. SEVERITY: \"How bad is the pain?\"  (e.g., Scale 1-10; mild, moderate, or severe)      7-8/10  7. RECURRENT SYMPTOM: \"Have you ever had this type of stomach pain before?\" If Yes, ask: \"When was the last time?\" and \"What happened that time?\"       Yes treating ulcer  8. AGGRAVATING FACTORS: \"Does anything seem to cause this pain?\" (e.g., foods, stress, alcohol)      Vomiting movement and drinking make it worse  9. CARDIAC SYMPTOMS: \"Do you have any of the following symptoms: chest pain, difficulty breathing, sweating, nausea?\"      Hot and cold flashes like when getting sick  10. OTHER SYMPTOMS: \"Do you have any other symptoms?\" (e.g., back pain, diarrhea, fever, urination pain, vomiting)        Vomiting, no diarrhea  11. PREGNANCY: \"Is there any chance you are pregnant?\" \"When was your last menstrual period?\"        Has IUD    Protocols used: Abdominal Pain - Upper-A-OH, Vomiting-A-OH  Tabatha Thomas RN   "

## 2025-04-17 NOTE — TELEPHONE ENCOUNTER
Pt called back to the clinic unable to be seen in the ER. Requesting if she can be seen at [facility]. Triage called [facility] for referral.     Referral to Acute and Diagnostic Services    [phone] ([place]) [place] [id number], [address], [place] [address]    Transition to Acute & Diagnostic Services Clinic has been discussed with patient, and she agrees with next level of care.   Patient understands that evaluation/treatment at [facility] typically takes significantly longer than in clinic/urgent care (>2 hours).  [facility] has been contacted by provider/staff to confirm patient acceptance.         Special issues:    None

## 2025-04-17 NOTE — Clinical Note
Dr. Paez,  I saw Araceli in the [facility] today and her epigastric pain has really worsened. I noticed you mentioned an endoscopy for her if not improving, so told her I'd reach out to you to see if you would be okay with ordering this.Thanks.

## 2025-04-17 NOTE — PATIENT INSTRUCTIONS
I will send a note to Dr. Paez about ordering endoscopy to evaluate for an ulcer/gastritis.   Continue omeprazole, we will add sucralfate. May also use Zofran for nausea, and try Pepto Bismol/Tums as needed.   Avoid ibuprofen, alcohol, and other dietary recommendations below.     Gastritis    Gastritis is inflammation and irritation of the stomach lining. You can have it for a short time (acute) or be long lasting (chronic). Infection with bacteria called H pylori most often causes gastritis. More than a third of people in the [place] have these bacteria in their bodies. In many cases, H pylori causes no problems or symptoms. In some people, though, the infection irritates the stomach lining and causes gastritis. H. pylori may be diagnosed through blood, stool, or breath tests, we well as through biopsy during an endoscopy. Other causes of stomach irritation include drinking alcohol, smoking or chewing tobacco, or taking pain-relieving medicines called NSAIDs (such as aspirin or ibuprofen). Certain drugs (such as cocaine) and immune conditions can also cause gastritis.  Symptoms of gastritis can include:  Belly pain or bloating  Feeling full quickly  Loss of appetite  Nausea or vomiting  Vomiting blood or having black stools  Feeling more tired than usual  An inflamed and irritated stomach lining is more likely to develop a sore called an ulcer. To help prevent this, gastritis should be treated.  Home care  Lifestyle changes can help reduce symptoms. If needed, your healthcare provider may prescribe medicines. Symptoms often improve with treatment, but if treatment is stopped, the symptoms often return after a few months. So most persons with GERD will need to continue treatment or get treatment on and off.  Lifestyle changes  Drinking six to eight glasses of water daily and eating high-fiber, low-fat foods (for example, fruits, vegetables, and whole-grain breads and cereals) are recommended. Drinking carbonated  "beverages, alcohol, and caffeinated beverages; eating high-fat and spicy foods; and smoking are discouraged. Some foods, such as beans, cabbage, and cauliflower, naturally produce gas and should be limited or avoided. Meals that are small, regular, and frequent are encouraged because they are easier to digest than large ones.   Don t eat large meals, especially at night. Frequent, smaller meals are best. Don't lie down right after eating. And don t eat anything 3 hours before going to bed.  Don't drink alcohol or smoke. As much as possible, stay away from second hand smoke.  If you are overweight, losing weight will reduce symptoms.   Don't wear tight clothing around your stomach area.  If your symptoms occur during sleep, use a foam wedge to elevate your upper body (not just your head.) Or, place 4\" blocks under the head of your bed. Or use 2 bed risers under your bedframe.  Medicines  If needed, medicines can help relieve the symptoms of GERD and prevent damage to the esophagus. Discuss a medicine plan with your healthcare provider. This may include one or more of the following medicines:  Antacids to help neutralize the normal acids in your stomach.  Acid blockers (Histamine or H2 blockers) to decrease acid production.  Acid inhibitors (proton pump inhibitors PPIs) to decrease acid production in a different way than the blockers. They may work better, but can take a little longer to take effect.  Take an antacid 30 to 60 minutes after eating and at bedtime, but not at the same time as an acid blocker.  Try not to take medicines such as ibuprofen and aspirin. If you are taking aspirin for your heart or other medical reasons, talk to your healthcare provider about stopping it.If needed, our healthcare provider may prescribe medicines. If you have H pylori infection, treating it will likely relieve your symptoms. Other changes can help reduce stomach irritation and help it heal.  If you have been prescribed " medicines for H pylori infection, take them as directed. Take all of the medicine until it is finished or your healthcare provider tells you to stop, even if you feel better.  Follow-up care  Follow up with your healthcare provider, or as advised by our staff. You may need testing to check for inflammation or an ulcer.  When to seek medical advice  Call your healthcare provider for any of the following:  Stomach pain that gets worse or moves to the lower right belly (appendix area)  Chest pain that appears or gets worse, or spreads to the back, neck, shoulder, or arm  Frequent vomiting (can t keep down liquids)  Blood in the stool or vomit (red or black in color)  Feeling weak or dizzy  Shortness of breath  Unexplained weight loss  Fever of 100.4 F (38 C) or higher, or as directed by your healthcare provider

## 2025-04-23 ENCOUNTER — OFFICE VISIT (OUTPATIENT)
Dept: FAMILY MEDICINE | Facility: CLINIC | Age: 30
End: 2025-04-23
Payer: COMMERCIAL

## 2025-04-23 VITALS
TEMPERATURE: 97.8 F | BODY MASS INDEX: 21.38 KG/M2 | SYSTOLIC BLOOD PRESSURE: 98 MMHG | RESPIRATION RATE: 15 BRPM | DIASTOLIC BLOOD PRESSURE: 64 MMHG | HEART RATE: 79 BPM | OXYGEN SATURATION: 98 % | HEIGHT: 66 IN | WEIGHT: 133 LBS

## 2025-04-23 DIAGNOSIS — R10.13 ABDOMINAL PAIN, EPIGASTRIC: Primary | ICD-10-CM

## 2025-04-23 DIAGNOSIS — R11.2 INTRACTABLE NAUSEA AND VOMITING: ICD-10-CM

## 2025-04-23 DIAGNOSIS — M67.431 GANGLION CYST OF WRIST, RIGHT: ICD-10-CM

## 2025-04-23 PROCEDURE — 3074F SYST BP LT 130 MM HG: CPT | Performed by: FAMILY MEDICINE

## 2025-04-23 PROCEDURE — 99214 OFFICE O/P EST MOD 30 MIN: CPT | Performed by: FAMILY MEDICINE

## 2025-04-23 PROCEDURE — 1125F AMNT PAIN NOTED PAIN PRSNT: CPT | Performed by: FAMILY MEDICINE

## 2025-04-23 PROCEDURE — 3078F DIAST BP <80 MM HG: CPT | Performed by: FAMILY MEDICINE

## 2025-04-23 ASSESSMENT — PAIN SCALES - GENERAL: PAINLEVEL_OUTOF10: MILD PAIN (1)

## 2025-04-23 NOTE — PROGRESS NOTES
Assessment & Plan     Abdominal pain, epigastric  On and off epigastric pain worse with eating.  CT scan was done which was noncontributory and nonspecific finding however she feels this has been ongoing she is currently only on omeprazole advised to continue that I would suggest her to get an upper GI endoscopy to rule out any ulcer which may be contributing to that.  Referral provided.  - Adult GI  Referral - Procedure Only; Future    Intractable nausea and vomiting    - Adult GI  Referral - Procedure Only; Future    Ganglion cyst of wrist, right  Ganglion cyst of the wrist suggested orthopedic evaluation currently asymptomatic.  - Orthopedic  Referral; Future                Subjective   Araceli is a 30 year old, presenting for the following health issues:  Gastric Problem  (For follow-up on recent visit regarding intractable nausea vomiting epigastric pain.  On further questioning she has this episodes on and off for the last 3 months worsening over the last 1 week which made her go to the urgency center where she was given fluid and CT scan was done which was somewhat noncontributory but she was given medication including omeprazole and sucralfate which has helped but she continues have mild discomfort with eating.  This has been ongoing.  She was advised to get further evaluation including upper GI endoscopy.        4/23/2025     9:42 AM   Additional Questions   Roomed by Kenrick CABRERA     Gastric Problem    History of Present Illness       Reason for visit:  Stomach follow up and lump on hand    She eats 2-3 servings of fruits and vegetables daily.She consumes 0 sweetened beverage(s) daily.She exercises with enough effort to increase her heart rate 20 to 29 minutes per day.  She exercises with enough effort to increase her heart rate 4 days per week.   She is taking medications regularly.          GERD/Heartburn  Onset/Duration: Follow up from [facility]  Description: umbilical pain - CT was done,  "would like to get order for endoscopy   Intensity: moderate  Progression of Symptoms: improving  Accompanying Signs & Symptoms:  Does it feel like food gets stuck or trouble swallowing: No      Concern - Lump  Onset: 6 months  Description: Which is present for the last few years slightly increased in size she would like to get further evaluation        Review of Systems  Constitutional, HEENT, cardiovascular, pulmonary, gi and gu systems are negative, except as otherwise noted.      Objective    BP 98/64   Pulse 79   Temp 97.8  F (36.6  C) (Tympanic)   Resp 15   Ht 1.68 m (5' 6.14\")   Wt 60.3 kg (133 lb)   LMP 04/20/2025 (Exact Date)   SpO2 98%   BMI 21.37 kg/m    Body mass index is 21.37 kg/m .  Physical Exam   GENERAL: alert and no distress  NECK: no adenopathy, no asymmetry, masses, or scars  RESP: lungs clear to auscultation - no rales, rhonchi or wheezes  CV: regular rate and rhythm, normal S1 S2, no S3 or S4, no murmur, click or rub, no peripheral edema  ABDOMEN: soft,slight tender in epigastric  area, no hepatosplenomegaly, no masses and bowel sounds normal  MS: no gross musculoskeletal defects noted, no edema  .  Cyst on the right wrist.        Signed Electronically by: Ok Buenrostro MD    "

## 2025-04-28 ENCOUNTER — PATIENT OUTREACH (OUTPATIENT)
Dept: CARE COORDINATION | Facility: CLINIC | Age: 30
End: 2025-04-28
Payer: COMMERCIAL

## 2025-04-30 ENCOUNTER — TELEPHONE (OUTPATIENT)
Dept: GASTROENTEROLOGY | Facility: CLINIC | Age: 30
End: 2025-04-30
Payer: COMMERCIAL

## 2025-04-30 NOTE — TELEPHONE ENCOUNTER
Pre visit planning completed.      Procedure details:    Patient scheduled for Upper endoscopy (EGD) on 5/7/25.     Arrival time: 1215. Procedure time 1300    Facility location: [facility]; [address] [place] [address] [place] [address]. Check in location: 1st [facility].     Sedation type: Conscious sedation     Pre op exam needed? No.    Indication for procedure: epigastric pain, n/v      Chart review:     Electronic implanted devices? No    Recent diagnosis of diverticulitis within the last 6 weeks? No      Medication review:    Diabetic? No    Anticoagulants? No    Weight loss medication/injectable? No GLP-1 medication per patient's medication list. Nursing to verify with pre-assessment call.    Other medication HOLDING recommendations:  EGD: Sucralfate (Carafate): HOLD 1 day before procedure.      Prep for procedure:     Bowel prep recommendation: N/A  Due to: EGD    Procedure information and instructions sent via [employer]         Barbara Amaro RN  Endoscopy Procedure Pre Assessment   [phone] option 3

## 2025-04-30 NOTE — TELEPHONE ENCOUNTER
"Endoscopy Scheduling Screen    Caller: patient    Have you had any respiratory illness or flu-like symptoms in the last 10 days?  No    What is your communication preference for Instructions and/or Bowel Prep?   MyChart    What insurance is in the chart?  Other:  [employer]    Ordering/Referring Provider: Ok Buenrostro MD   (If ordering provider performs procedure, schedule with ordering provider unless otherwise instructed. )    BMI: Estimated body mass index is 21.37 kg/m  as calculated from the following:    Height as of 4/23/25: 1.68 m (5' 6.14\").    Weight as of 4/23/25: 60.3 kg (133 lb).     Sedation Ordered  moderate sedation.   If patient BMI > 50 do not schedule in [facility].    If patient BMI > 45 do not schedule at [facility].    Are you taking methadone or Suboxone?  NO, No RN review required.    Have you been diagnosed and are being treated for severe PTSD or severe anxiety?  NO, No RN review required.    Are you taking any prescription medications for pain 3 or more times per week?   NO, No RN review required.    Do you have a history of malignant hyperthermia?  No    (Females) Are you currently pregnant?   No     Have you been diagnosed or told you have pulmonary hypertension?   No    Do you have an LVAD?  No    Have you been told you have moderate to severe sleep apnea?  No.    Have you been told you have COPD, asthma, or any other lung disease?  No    Has your doctor ordered any cardiac tests like echo, angiogram, stress test, ablation, or EKG, that you have not completed yet?  No    Do you  have a history of any heart conditions?  No     Have you ever had or are you waiting for an organ transplant?  No. Continue scheduling, no site restrictions.    Have you had a stroke or transient ischemic attack (TIA aka \"mini stroke\") in the last 2 years?   No.    Have you been diagnosed with or been told you have cirrhosis of the liver?   No.    Are you currently on dialysis?   No    Do you need assistance " "transferring?   No    BMI: Estimated body mass index is 21.37 kg/m  as calculated from the following:    Height as of 4/23/25: 1.68 m (5' 6.14\").    Weight as of 4/23/25: 60.3 kg (133 lb).     Is patients BMI > 40 and scheduling location [facility]?  No    Do you take an injectable or oral medication for weight loss or diabetes (excluding insulin)?  No    Do you take the medication Naltrexone?  No    Do you take blood thinners?  No       Prep   Are you currently on dialysis or do you have chronic kidney disease?  No    Do you have a diagnosis of diabetes?  No    Do you have a diagnosis of cystic fibrosis (CF)?  No    On a regular basis do you go 3 -5 days between bowel movements?  No    BMI > 40?  No    Preferred Pharmacy:    [id number] - [place] [facility] [place] - [address] AT [facility] [address]  [address] [facility] [address]  Phone: [phone] Fax: [phone]    Final Scheduling Details     Procedure scheduled  Upper endoscopy (EGD)    Surgeon:  THEO     Date of procedure:  5/7     Pre-OP / PAC:   No - Not required for this site.    Location  [facility] - Patient preference.    Sedation   Moderate Sedation - Per order.      Patient Reminders:   You will receive a call from a Nurse to review instructions and health history.  This assessment must be completed prior to your procedure.  Failure to complete the Nurse assessment may result in the procedure being cancelled.      On the day of your procedure, please designate an adult(s) who can drive you home stay with you for the next 24 hours. The medicines used in the exam will make you sleepy. You will not be able to drive.      You cannot take public transportation, ride share services, or non-medical taxi service without a responsible caregiver.  Medical transport services are allowed with the requirement that a responsible caregiver will receive you at your destination.  We require that drivers and caregivers are confirmed prior to " your procedure.

## 2025-04-30 NOTE — TELEPHONE ENCOUNTER
Pre assessment completed for upcoming procedure.   (Please see previous telephone encounter notes for complete details)      Procedure details:    Arrival time and facility location reviewed.    Pre op exam needed? No.    Designated  policy reviewed. Instructed to have someone stay 6  hours post procedure.       Medication review:    Medications reviewed. Please see supporting documentation below. Holding recommendations discussed (if applicable).       Prep for procedure:     Procedure prep instructions reviewed.        Any additional information needed:  N/A      Patient verbalized understanding and had no questions or concerns at this time.      Barbara Amaro RN  Endoscopy Procedure Pre Assessment   [phone] option 3

## 2025-05-05 DIAGNOSIS — R11.2 NAUSEA AND VOMITING, UNSPECIFIED VOMITING TYPE: ICD-10-CM

## 2025-05-05 DIAGNOSIS — R10.13 EPIGASTRIC PAIN: ICD-10-CM

## 2025-05-05 RX ORDER — SUCRALFATE 1 G/1
1 TABLET ORAL 4 TIMES DAILY
Qty: 40 TABLET | Refills: 1 | OUTPATIENT
Start: 2025-05-05

## 2025-05-05 NOTE — TELEPHONE ENCOUNTER
Please call the patient and schedule VV of GERD and medication follow up with me, which patient is due at this time, to further take care of the medical conditions and medications. OK to use same day slot / 8 AM on Tuesdays/Fridays in 1-2 weeks.     ( FYI - Pt doesn't respond [email or web address] messages - please call instead of sending [employer] message for scheduling )     Thank you,  Blessing Paez MD on 5/5/2025 at 6:27 PM

## 2025-05-05 NOTE — TELEPHONE ENCOUNTER
Medication passed protocol, however, refill RN could not approve because  prescribed in [facility], should patient continue to take medication?  Provider, please approve or deny the prescription.    Yary COOMBS RN  [facility] Triage Team

## 2025-05-07 ENCOUNTER — HOSPITAL ENCOUNTER (OUTPATIENT)
Facility: CLINIC | Age: 30
Discharge: HOME | End: 2025-05-07
Attending: INTERNAL MEDICINE | Admitting: INTERNAL MEDICINE
Payer: COMMERCIAL

## 2025-05-07 VITALS
HEART RATE: 101 BPM | DIASTOLIC BLOOD PRESSURE: 100 MMHG | SYSTOLIC BLOOD PRESSURE: 125 MMHG | OXYGEN SATURATION: 100 % | RESPIRATION RATE: 17 BRPM

## 2025-05-07 LAB — UPPER GI ENDOSCOPY: NORMAL

## 2025-05-07 PROCEDURE — G0500 MOD SEDAT ENDO SERVICE >5YRS: HCPCS | Performed by: INTERNAL MEDICINE

## 2025-05-07 PROCEDURE — 88342 IMHCHEM/IMCYTCHM 1ST ANTB: CPT | Mod: TC | Performed by: INTERNAL MEDICINE

## 2025-05-07 PROCEDURE — 88305 TISSUE EXAM BY PATHOLOGIST: CPT | Mod: 26 | Performed by: PATHOLOGY

## 2025-05-07 PROCEDURE — 88342 IMHCHEM/IMCYTCHM 1ST ANTB: CPT | Mod: 26 | Performed by: PATHOLOGY

## 2025-05-07 PROCEDURE — 250N000011 HC RX IP 250 OP 636: Mod: JZ | Performed by: INTERNAL MEDICINE

## 2025-05-07 PROCEDURE — 43239 EGD BIOPSY SINGLE/MULTIPLE: CPT | Performed by: INTERNAL MEDICINE

## 2025-05-07 RX ORDER — FENTANYL CITRATE 50 UG/ML
INJECTION, SOLUTION INTRAMUSCULAR; INTRAVENOUS PRN
Status: DISCONTINUED | OUTPATIENT
Start: 2025-05-07 | End: 2025-05-07 | Stop reason: HOSPADM

## 2025-05-07 RX ORDER — ONDANSETRON 2 MG/ML
INJECTION INTRAMUSCULAR; INTRAVENOUS PRN
Status: DISCONTINUED | OUTPATIENT
Start: 2025-05-07 | End: 2025-05-07 | Stop reason: HOSPADM

## 2025-05-07 RX ORDER — DIPHENHYDRAMINE HYDROCHLORIDE 50 MG/ML
INJECTION, SOLUTION INTRAMUSCULAR; INTRAVENOUS PRN
Status: DISCONTINUED | OUTPATIENT
Start: 2025-05-07 | End: 2025-05-07 | Stop reason: HOSPADM

## 2025-05-07 ASSESSMENT — ACTIVITIES OF DAILY LIVING (ADL)
ADLS_ACUITY_SCORE: 41

## 2025-05-07 NOTE — H&P
[facility]  Pre-Endoscopy History and Physical     Araceli Fisher MRN# [id number]   YOB: 1995 Age: 30 year old     Date of Procedure: 5/7/2025  Primary care provider: Blessing Paez  Type of Endoscopy: esophagogastroduodenoscopy (upper GI endoscopy)  Reason for Procedure: abd pain, N/V  Type of Anesthesia Anticipated: Moderate Sedation    HPI:    Araceli is a 30 year old female who will be undergoing the above procedure.      A history and physical has been performed. The patient's medications and allergies have been reviewed. The risks and benefits of the procedure and the sedation options and risks were discussed with the patient.  All questions were answered and informed consent was obtained.      She denies a personal or family history of anesthesia complications or bleeding disorders.     Allergies   Allergen Reactions    Penicillins Rash        Prior to Admission Medications   Prescriptions Last Dose Informant Patient Reported? Taking?   albuterol (PROAIR HFA/PROVENTIL HFA/VENTOLIN HFA) 108 (90 Base) MCG/ACT inhaler Unknown  No No   Sig: Inhale 2 puffs into the lungs every 6 hours as needed for shortness of breath, wheezing or cough.   omeprazole (PRILOSEC) 20 MG DR capsule 5/5/2025  No No   Sig: Take 1 capsule (20 mg) by mouth daily.   ondansetron (ZOFRAN) 4 MG tablet Unknown  No No   Sig: Take 1 tablet (4 mg) by mouth every 8 hours as needed for nausea.   Patient not taking: Reported on 4/23/2025      Facility-Administered Medications Last Administration Doses Remaining   sodium chloride (PF) 0.9% PF flush 3 mL None recorded           Patient Active Problem List   Diagnosis    Asthma    VA (generalized anxiety disorder)        Past Medical History:   Diagnosis Date    Dysphagia     PONV (postoperative nausea and vomiting)     Uncomplicated asthma     as teenager        Past Surgical History:   Procedure Laterality Date    TONSILLECTOMY         Social History  "    Tobacco Use    Smoking status: Never    Smokeless tobacco: Never   Substance Use Topics    Alcohol use: Not Currently       Family History   Problem Relation Age of Onset    Breast Cancer Paternal Grandmother     Other Problems  (celiac sprue) Cousin     Other Problems  (Other Problems ) Maternal Aunt        REVIEW OF SYSTEMS:     5 point ROS negative except as noted above in HPI, including Gen., Resp., CV, GI &  system review.      PHYSICAL EXAM:   Resp 16   LMP 04/20/2025 (Exact Date)  Estimated body mass index is 21.37 kg/m  as calculated from the following:    Height as of 4/23/25: 1.68 m (5' 6.14\").    Weight as of 4/23/25: 60.3 kg (133 lb).   GENERAL APPEARANCE: healthy, alert, and no distress  MENTAL STATUS: alert  AIRWAY EXAM: Mallampatti Class II (visualization of the soft palate, fauces, and uvula)  RESP: lungs clear to auscultation - no rales, rhonchi or wheezes  CV: regular rates and rhythm      DIAGNOSTICS:    Not indicated      IMPRESSION   ASA Class 2 - Mild systemic disease        PLAN:       Plan for esophagogastroduodenoscopy (upper GI endoscopy). We discussed the risks, benefits and alternatives and the patient wished to proceed.    The above has been forwarded to the consulting provider.      Signed Electronically by: Espinoza Ulloa MD  May 7, 2025    Espinoza Ulloa MD  [facility]  Cell: [phone]  Office Phone: [phone]  Office Fax: [phone]        "

## 2025-05-08 LAB
PATH REPORT.COMMENTS IMP SPEC: NORMAL
PATH REPORT.COMMENTS IMP SPEC: NORMAL
PATH REPORT.FINAL DX SPEC: NORMAL
PATH REPORT.GROSS SPEC: NORMAL
PATH REPORT.MICROSCOPIC SPEC OTHER STN: NORMAL
PATH REPORT.RELEVANT HX SPEC: NORMAL
PHOTO IMAGE: NORMAL

## 2025-05-09 ENCOUNTER — MYC MEDICAL ADVICE (OUTPATIENT)
Dept: FAMILY MEDICINE | Facility: CLINIC | Age: 30
End: 2025-05-09
Payer: COMMERCIAL

## 2025-05-09 LAB
PATH REPORT.ADDENDUM SPEC: NORMAL
PATH REPORT.COMMENTS IMP SPEC: NORMAL
PATH REPORT.COMMENTS IMP SPEC: NORMAL
PATH REPORT.FINAL DX SPEC: NORMAL
PATH REPORT.GROSS SPEC: NORMAL
PATH REPORT.MICROSCOPIC SPEC OTHER STN: NORMAL
PATH REPORT.RELEVANT HX SPEC: NORMAL
PHOTO IMAGE: NORMAL

## 2025-05-13 ENCOUNTER — VIRTUAL VISIT (OUTPATIENT)
Dept: FAMILY MEDICINE | Facility: CLINIC | Age: 30
End: 2025-05-13
Payer: COMMERCIAL

## 2025-05-13 DIAGNOSIS — K20.90 ESOPHAGITIS: ICD-10-CM

## 2025-05-13 DIAGNOSIS — F41.1 GAD (GENERALIZED ANXIETY DISORDER): ICD-10-CM

## 2025-05-13 DIAGNOSIS — K21.01 GASTROESOPHAGEAL REFLUX DISEASE WITH ESOPHAGITIS AND HEMORRHAGE: Primary | ICD-10-CM

## 2025-05-13 DIAGNOSIS — K29.70 GASTRITIS WITHOUT BLEEDING, UNSPECIFIED CHRONICITY, UNSPECIFIED GASTRITIS TYPE: ICD-10-CM

## 2025-05-13 DIAGNOSIS — R10.13 ABDOMINAL PAIN, EPIGASTRIC: ICD-10-CM

## 2025-05-13 DIAGNOSIS — R63.4 LOSS OF WEIGHT: ICD-10-CM

## 2025-05-13 PROCEDURE — 98006 SYNCH AUDIO-VIDEO EST MOD 30: CPT | Performed by: INTERNAL MEDICINE

## 2025-05-13 RX ORDER — SERTRALINE HYDROCHLORIDE 25 MG/1
25 TABLET, FILM COATED ORAL DAILY
Qty: 90 TABLET | Refills: 1 | Status: SHIPPED | OUTPATIENT
Start: 2025-05-13

## 2025-05-13 RX ORDER — OMEPRAZOLE 20 MG/1
20 CAPSULE, DELAYED RELEASE ORAL DAILY
Qty: 90 CAPSULE | Refills: 1 | Status: SHIPPED | OUTPATIENT
Start: 2025-05-13

## 2025-05-13 NOTE — PROGRESS NOTES
Araceli is a 30 year old who is being evaluated via a billable video visit.    How would you like to obtain your AVS? MyChart  If the video visit is dropped, the invitation should be resent by: Text to cell phone: [phone]  Will anyone else be joining your video visit? No        Assessment and Plan  1. Gastroesophageal reflux disease with esophagitis and hemorrhage (Primary)  Chronic problem, intermittent flareups depending on the foods patient is eating.  Emphasized on avoiding all the triggering factors, continue omeprazole status post recent EGD showing gastritis and esophagitis as mentioned below.  Biopsies negative for H. pylori, to follow-up with gastroenterology recommendations and follow-up as suggested.  -Placed referral to nutritionist as patient is having difficulty choosing the foods and avoiding weight loss which has been happening as mentioned below.  Patient understood with plan.  - omeprazole (PRILOSEC) 20 MG DR capsule; Take 1 capsule (20 mg) by mouth daily.  Dispense: 90 capsule; Refill: 1  - Adult Nutrition  Referral; Future    2. Gastritis without bleeding, unspecified chronicity, unspecified gastritis type  3. Abdominal pain, epigastric  4. Esophagitis  New diagnosis of esophagitis and gastritis as per the recent endoscopy done for patient's ongoing epigastric pain, discussed most part of this appointment to avoid triggering factors which is mainly alcohol and the kind of foods patient is continuing.  Emphasized to quit alcohol completely for preventing further flareup of this, to take omeprazole at least for 3 months before further recommendations.  Patient understands the plan.  - omeprazole (PRILOSEC) 20 MG DR capsule; Take 1 capsule (20 mg) by mouth daily.  Dispense: 90 capsule; Refill: 1  - Adult Nutrition  Referral; Future    5. VA (generalized anxiety disorder)  Ongoing problem, uncontrolled.  Patient endorses that she did take Zoloft postpartum issues in the past.   Shared decision to restart Zoloft which will help in preventing anxiety induced acid reflux as well mentioned above.  Follow-up instructions given.  - sertraline (ZOLOFT) 25 MG tablet; Take 1 tablet (25 mg) by mouth daily.  Dispense: 90 tablet; Refill: 1    6. Loss of weight  New problem which patient endorses that she has been losing weight lately in order to get her food choices for preventing GERD issues as mentioned above, as per chart review patient was 136 pounds in January 2025, she came down to 133 pounds on the last check done in April 2025.  Patient endorses that she has been continuing to lose weight even till date, shared decision for a nutritionist referral who can choose better diet regimen which will help in preventing further weight loss as well as the GERD issues.  Patient understood and with the plan  - Adult Nutrition  Referral; Future       The longitudinal plan of care for the diagnosis(es)/condition(s) as documented were addressed during this visit. Due to the added complexity in care, I will continue to support Araceli in the subsequent management and with ongoing continuity of care.      Please note that this note consists of symbols derived from keyboarding, dictation and/or voice recognition software. As a result, there may be errors in the script that have gone undetected. Please consider this when interpreting information found in this chart.    Patient Instructions   As dicussed, please do continue Omeprazole for atleast 3 months and follow up with GI if no improvement     Started on Zoloft low dose for anxiety and follow up in 4 weeks for any increase needed.     Placed referral nutrition to Nutrition for a tailored program on your diet for weight loss to be prevented further. In the meantime consume protein diet for improvement.     Return in about 6 months (around 11/13/2025), or if symptoms worsen or fail to improve, for Follow up of last visit, GERD, video visit,  anxiety.    Blessing Paez MD  [facility] [place] is a 30 year old, presenting for the following health issues:  Recheck Medication (Sucralfate) and Results (Discuss upper endoscopy from 5/7/25)        5/13/2025     7:30 AM   Additional Questions   Roomed by Daisy     [facility]      Medication Followup of Sucralfate  Taking Medication as prescribed: yes  Side Effects:  None  Medication Helping Symptoms:  yes      Last seen patient in February 2025 for annual physical and establish care at that time, she has been having abdominal pain which was treated as GERD with all the workup done by me at that time including pancreatic enzymes, H. pylori was negative.      Given the persistent abdominal pain patient has been seen x 2 providers in the interim with endoscopy orders placed, she is here to discuss some EGD results.          Allergies   Allergen Reactions    Penicillins Rash        Past Medical History:   Diagnosis Date    Dysphagia     PONV (postoperative nausea and vomiting)     Uncomplicated asthma     as teenager       Past Surgical History:   Procedure Laterality Date    ESOPHAGOSCOPY, GASTROSCOPY, DUODENOSCOPY (EGD), COMBINED N/A 5/7/2025    Procedure: Esophagoscopy, gastroscopy, duodenoscopy (EGD), combined;  Surgeon: Espinoza Ulloa MD;  Location:  GI    TONSILLECTOMY         Family History   Problem Relation Age of Onset    Breast Cancer Paternal Grandmother     Other Problems  (celiac sprue) Cousin     Other Problems  (Other Problems ) Maternal Aunt        Social History     Tobacco Use    Smoking status: Never    Smokeless tobacco: Never   Substance Use Topics    Alcohol use: Not Currently        Current Outpatient Medications   Medication Sig Dispense Refill    albuterol (PROAIR HFA/PROVENTIL HFA/VENTOLIN HFA) 108 (90 Base) MCG/ACT inhaler Inhale 2 puffs into the lungs every 6 hours as needed for shortness of breath, wheezing or cough. 18 g 1    omeprazole  (PRILOSEC) 20 MG DR capsule Take 1 capsule (20 mg) by mouth daily. 90 capsule 1    ondansetron (ZOFRAN) 4 MG tablet Take 1 tablet (4 mg) by mouth every 8 hours as needed for nausea. 30 tablet 5    sertraline (ZOLOFT) 25 MG tablet Take 1 tablet (25 mg) by mouth daily. 90 tablet 1     Current Facility-Administered Medications   Medication Dose Route Frequency Provider Last Rate Last Admin    sodium chloride (PF) 0.9% PF flush 3 mL  3 mL Intravenous q1 min prn               Review of Systems  Constitutional, HEENT, cardiovascular, pulmonary, GI, , musculoskeletal, neuro, skin, endocrine and psych systems are negative, except as otherwise noted.      Objective           Vitals:  No vitals were obtained today due to virtual visit.    Physical Exam   GENERAL: alert and no distress  EYES: Eyes grossly normal to inspection.  No discharge or erythema, or obvious scleral/conjunctival abnormalities.  RESP: No audible wheeze, cough, or visible cyanosis.    SKIN: Visible skin clear. No significant rash, abnormal pigmentation or lesions.  NEURO: Cranial nerves grossly intact.  Mentation and speech appropriate for age.  PSYCH: Appropriate affect, tone, and pace of words      Video-Visit Details    Type of service:  Video Visit   Originating Location (pt. Location): Home    Distant Location (provider location):  On-site  Platform used for Video Visit: Saad  Signed Electronically by: Blessing Paez MD

## 2025-05-13 NOTE — PATIENT INSTRUCTIONS
As dicussed, please do continue Omeprazole for atleast 3 months and follow up with GI if no improvement     Started on Zoloft low dose for anxiety and follow up in 4 weeks for any increase needed.     Placed referral nutrition to Nutrition for a tailored program on your diet for weight loss to be prevented further. In the meantime consume protein diet for improvement.

## 2025-05-14 ENCOUNTER — TELEPHONE (OUTPATIENT)
Dept: FAMILY MEDICINE | Facility: CLINIC | Age: 30
End: 2025-05-14
Payer: COMMERCIAL

## 2025-05-14 ENCOUNTER — PATIENT OUTREACH (OUTPATIENT)
Dept: CARE COORDINATION | Facility: CLINIC | Age: 30
End: 2025-05-14
Payer: COMMERCIAL

## 2025-05-14 NOTE — TELEPHONE ENCOUNTER
Prior Authorization Retail Medication Request    Medication/Dose: omeprazole (PRILOSEC) 20 MG DR capsule  Diagnosis and ICD code (if different than what is on RX):    New/renewal/insurance change PA/secondary ins. PA:  Previously Tried and Failed:    Rationale:      Insurance   Primary:   Insurance ID:  [id number]    Secondary (if applicable):  Insurance ID:      Pharmacy Information (if different than what is on RX)  Name:    Phone:  [phone]  Fax:    Clinic Information  Preferred routing pool for dept communication:

## 2025-05-16 NOTE — TELEPHONE ENCOUNTER
Retail Pharmacy Prior Authorization Team   Phone: [phone]      PRIOR AUTHORIZATION DENIED    Medication: OMEPRAZOLE 20 MG PO CPDR  Insurance Company: [employer]-Specialty PA's - Phone [phone] Fax [phone]  Denial Date: 5/16/2025  Denial Reason(s):           Appeal Information: Drug exclusions cannot be appealed.  This medication will not be covered by the prescription plan for any reason. The drug is not on formulary and there are no loopholes to gaining approval.    Patient Notified: No. The Retail Central PA Team does not notify of the denial outcomes as the patient often will ask what their provider will prescribe in place of the denied medication, or additional information in regards to other therapies they can take in place of the denied medication.  This is not something we can advise on in our department.

## 2025-05-16 NOTE — TELEPHONE ENCOUNTER
Spoke to patient, patient stated she was already informed by pharmacy that it was not covered by insurance and she can just get it over the counter.     Patient states that she is taking omeprazole 20 mg but over the counter and would like to confirm if that's ok with the provider.     Please advise.     Sameera Gudino RN

## 2025-05-22 ENCOUNTER — MYC MEDICAL ADVICE (OUTPATIENT)
Dept: FAMILY MEDICINE | Facility: CLINIC | Age: 30
End: 2025-05-22
Payer: COMMERCIAL

## 2025-05-22 NOTE — TELEPHONE ENCOUNTER
Patient just had Video Visit on 5/13/5 with PCP.      Please see  message and advise. Would patient need a visit?     Sameera Gudino RN

## 2025-06-03 ENCOUNTER — LAB (OUTPATIENT)
Dept: LAB | Facility: CLINIC | Age: 30
End: 2025-06-03
Payer: COMMERCIAL

## 2025-06-03 DIAGNOSIS — Z13.21 ENCOUNTER FOR VITAMIN DEFICIENCY SCREENING: ICD-10-CM

## 2025-06-03 LAB
IRON BINDING CAPACITY (ROCHE): 242 UG/DL (ref 240–430)
IRON SATN MFR SERPL: 21 % (ref 15–46)
IRON SERPL-MCNC: 51 UG/DL (ref 37–145)
VIT B12 SERPL-MCNC: 292 PG/ML (ref 232–1245)
VIT D+METAB SERPL-MCNC: 32 NG/ML (ref 20–50)

## 2025-06-03 PROCEDURE — 82306 VITAMIN D 25 HYDROXY: CPT

## 2025-06-03 PROCEDURE — 36415 COLL VENOUS BLD VENIPUNCTURE: CPT

## 2025-06-03 PROCEDURE — 82607 VITAMIN B-12: CPT

## 2025-06-03 PROCEDURE — 83550 IRON BINDING TEST: CPT

## 2025-06-03 PROCEDURE — 83540 ASSAY OF IRON: CPT

## 2025-06-05 ENCOUNTER — RESULTS FOLLOW-UP (OUTPATIENT)
Dept: FAMILY MEDICINE | Facility: CLINIC | Age: 30
End: 2025-06-05

## 2025-06-23 ENCOUNTER — TRANSCRIBE ORDERS (OUTPATIENT)
Dept: CALL CENTER | Age: 30
End: 2025-06-23
Payer: COMMERCIAL

## 2025-06-23 DIAGNOSIS — Z02.89 SELF-REFERRAL: Primary | ICD-10-CM

## 2025-06-30 ENCOUNTER — VIRTUAL VISIT (OUTPATIENT)
Dept: GASTROENTEROLOGY | Facility: CLINIC | Age: 30
End: 2025-06-30
Attending: INTERNAL MEDICINE
Payer: COMMERCIAL

## 2025-06-30 VITALS — WEIGHT: 120 LBS | HEIGHT: 66 IN | BODY MASS INDEX: 19.29 KG/M2

## 2025-06-30 DIAGNOSIS — K21.01 GASTROESOPHAGEAL REFLUX DISEASE WITH ESOPHAGITIS AND HEMORRHAGE: ICD-10-CM

## 2025-06-30 DIAGNOSIS — R63.4 LOSS OF WEIGHT: ICD-10-CM

## 2025-06-30 DIAGNOSIS — K20.90 ESOPHAGITIS: ICD-10-CM

## 2025-06-30 DIAGNOSIS — K29.70 GASTRITIS WITHOUT BLEEDING, UNSPECIFIED CHRONICITY, UNSPECIFIED GASTRITIS TYPE: ICD-10-CM

## 2025-06-30 PROCEDURE — 99207 PR NO CHARGE LOS: CPT | Mod: 95 | Performed by: DIETITIAN, REGISTERED

## 2025-06-30 PROCEDURE — 97802 MEDICAL NUTRITION INDIV IN: CPT | Mod: 95 | Performed by: DIETITIAN, REGISTERED

## 2025-06-30 PROCEDURE — 1126F AMNT PAIN NOTED NONE PRSNT: CPT | Mod: 95 | Performed by: DIETITIAN, REGISTERED

## 2025-06-30 ASSESSMENT — PAIN SCALES - GENERAL: PAINLEVEL_OUTOF10: NO PAIN (0)

## 2025-06-30 NOTE — NURSING NOTE
Current patient location: [address] [place] [address] [address] [place] [address]    Is the patient currently in the state of [place]? YES    Visit mode: VIDEO    If the visit is dropped, the patient can be reconnected by:VIDEO VISIT: Text to cell phone:   Telephone Information:   Mobile [phone]       Will anyone else be joining the visit? NO  (If patient encounters technical issues they should call [phone]    Are changes needed to the allergy or medication list? No    Are refills needed on medications prescribed by this physician? NO    Rooming Documentation:  Not applicable    Reason for visit: Consult    Lizett ARNETT

## 2025-06-30 NOTE — PROGRESS NOTES
Virtual Visit Details    Type of service:  Video Visit and telephone visit. Started visit as video, but pt's microphone not working so switched to telephone visit.    Originating Location (pt. Location): Home  Distant Location (provider location):  On-site  Platform used for Video Visit: [employer] and telephone    [facility] Outpatient Medical Nutrition Therapy      Time Spent: 44 minutes  Session Type:  Initial  Referring Physician:  Blessing Paez MD  Reason for RD Visit:   GERD with esophagitis and hemorrhage, gastritis without bleeding, esophagitis, weight loss     Nutrition Assessment:  Patient is a 30 year old female with history that includes GERD with esophagitis and hemorrhage, gastritis without bleeding, esophagitis, malnutrition, VA and unintentional weight loss.    She stated that prior to January she had not had GI issues before, but after New Year's she started having issues with epigastric pain, significant stomach pain and some difficulty swallowing.  She stated that she didn't specifically noticed heartburn issues. She especially has an issues after drinking a glass of wine where she was having significant issues, vomiting and had to go to hospital for fluids. She had an upper endoscopy and was told she had GERD with esophagitis and gastritis.  She took omeprazole for a couple of weeks, but did not realize she was supposed to stay on it for 3 months, so restarted and has been taking omeprazole now.  Additionally she has been following a GERD diet strictly, and with following the GERD diet recommendations as well as taking omeprazole she has not been having issues with pain or attacks, typically.  Sometimes with eating or directly after eating she will have some issues especially if she eats a foods such as onions.  She sometimes wakes up at night with stomach pain and was told possibly d/t not eating for awhile so has an evening snack such as some nuts and this seems to help. She has lost  "weight unintentionally due to symptoms and diet changes.  She does feel better on the diet, but losing weight.  She stated that she had been about 140 pounds, gotten down to 225 pounds last month and then weighed herself at home today and was 119 pounds.  She would like to prevent further weight loss, and reported that her close are all loose and fitting differently and people are noticing that she has been losing weight.  She stated that her  likes to follow more holistic type interventions, so occasionally she will have 1 to 2 ounces of aloe juice.  She has also been taking magnesium glycinate for energy and stress and feels that it is helpful.  She tried taking a multivitamin but did not tolerate it.  She is wondering if she will be able to retry foods or if she can retry some foods.  She has been sticking to more plain diet with chicken, turkey, eggs and cottage cheese to try to get more protein as well as eats yogurt some avocado and potato and rice.  She also makes a yogurt or Kefir smoothie with fruits and vegetables.  She tolerates salad she believes, but has not been eating a lot due to concern for putting dressing on her salad with concern they have vinegar and may cause more issues.  She has seen some ready-to-drink options at [employer] but was not sure if these were options for her.      Height:   Ht Readings from Last 1 Encounters:   06/30/25 1.676 m (5' 6\")     Weight:  Wt Readings from Last 10 Encounters:   06/30/25 54.4 kg (120 lb)   04/23/25 60.3 kg (133 lb)   04/17/25 60.3 kg (133 lb)   02/20/25 62.1 kg (136 lb 12.8 oz)   09/19/24 60.3 kg (133 lb)      BMI: Estimated body mass index is 19.37 kg/m  as calculated from the following:    Height as of this encounter: 1.676 m (5' 6\").    Weight as of this encounter: 54.4 kg (120 lb).    Diet Recall:  (some usual/recent meals/snacks/beverages): 3 main smaller meals and snacks throughout the day. She has been trying to get more protein. Prior to this " was not a big meat person but now trying to eat some for more protein.  Meal Food    Breakfast Smoothie with glory, strawberry, banana, kefir or gr yogurt, coconut water, spinach, 2 eggs, ww/sourdough toast, cottage cheese     Lunch Chicken/turkey breast, baked, sweet potato, avocado, rice/br rice, cottage cheese     Dinner Similar to lunch     Snacks String cheese and low fat yogurt, perfect bar, nuts. Sometimes lidia candy     Beverages Water, sometimes peppermint or licorice tea.  likes holistic options so drinking some aloe juice (1-2 oz) but not lately.      Alcohol Intake In April drank a couple of beers and tolerated (while on vacation) but at home had glass of wine and had significant symptoms     Frequency of eating/taking out meals: not as much lately b/c can't find anything she tolerates. Last week did have rice, grilled chicken, tzaki, feta, but had issues afterwards an believes it may have been the feta and fat content.     Labs:    Last Comprehensive Metabolic Panel:  Sodium   Date Value Ref Range Status   04/17/2025 139 135 - 145 mmol/L Final     Potassium   Date Value Ref Range Status   04/17/2025 4.3 3.4 - 5.3 mmol/L Final     Chloride   Date Value Ref Range Status   04/17/2025 114 (H) 94 - 109 mmol/L Final     Carbon Dioxide (CO2)   Date Value Ref Range Status   04/17/2025 25 20 - 32 mmol/L Final     Anion Gap   Date Value Ref Range Status   04/17/2025 <1 (L) 3 - 14 mmol/L Final     Glucose   Date Value Ref Range Status   04/17/2025 105 (H) 70 - 99 mg/dL Final     Urea Nitrogen   Date Value Ref Range Status   04/17/2025 17 7 - 30 mg/dL Final     Creatinine   Date Value Ref Range Status   04/17/2025 0.60 0.52 - 1.04 mg/dL Final     GFR Estimate   Date Value Ref Range Status   04/17/2025 >90 >60 mL/min/1.73m2 Final     Calcium   Date Value Ref Range Status   04/17/2025 9.5 8.5 - 10.1 mg/dL Final     CBC RESULTS:   Recent Labs   Lab Test 04/17/25  1031   WBC 9.5   RBC 5.02   HGB 14.3   HCT  44.3   MCV 88   MCH 28.5   MCHC 32.3   RDW 13.1          Pertinent Medications/vitamin and mineral supplements:   Reported taking magnesium glycinate.    Current Outpatient Medications   Medication Sig Dispense Refill    albuterol (PROAIR HFA/PROVENTIL HFA/VENTOLIN HFA) 108 (90 Base) MCG/ACT inhaler Inhale 2 puffs into the lungs every 6 hours as needed for shortness of breath, wheezing or cough. (Patient not taking: Reported on 6/30/2025) 18 g 1    omeprazole (PRILOSEC) 20 MG DR capsule Take 1 capsule (20 mg) by mouth daily. 90 capsule 1    ondansetron (ZOFRAN) 4 MG tablet Take 1 tablet (4 mg) by mouth every 8 hours as needed for nausea. 30 tablet 5    sertraline (ZOLOFT) 25 MG tablet Take 1 tablet (25 mg) by mouth daily. (Patient not taking: Reported on 6/30/2025) 90 tablet 1     Current Facility-Administered Medications   Medication Dose Route Frequency Provider Last Rate Last Admin    sodium chloride (PF) 0.9% PF flush 3 mL  3 mL Intravenous q1 min prn            Food Allergies:  NKFA    MALNUTRITION:  % Weight Loss:  > 10% in 6 months (severe malnutrition)  % Intake:  <75% for >/= 3 months (moderate malnutrition)  Subcutaneous Fat Loss: Unable to assess  Muscle Loss: Unable to assess  Fluid Retention:  None noted    Malnutrition Diagnosis: Moderate malnutrition  In Context of:  Acute illness or injury    Nutrition Prescription: Nutrition Education     Nutrition Intervention      Provided diet education for GERD with esophagitis and gastritis tips, also discussed tips for increasing calories and protein for unintentional weight loss and malnutrition.    Answered patient's questions. Patient verbalized understanding of education provided. See Goals below.     Educational Materials Provided: Nutrition care manual: GERD nutrition therapy    Goals:    1. Aim for eating 5-6 smaller meals per day. Eat these smaller meals spread out throughout the day versus too much in a short period of time.    --Some potential  food and beverage triggers for reflux include caffeine, carbonated beverages, alcoholic beverages, chocolate, peppermint, spearmint, black pepper, heavily spiced foods, high fats, eating large portions/large meals.    --For fruits and vegetables, this can be very individualized so if you tolerate certain options okay to continue to have them. Potentially citrus, tomato, tomato sauces, vinegars, onions and garlic are triggers but not for everyone so if you tolerate any then okay to continue eating them.    2.  Drink at least 2-3 smoothies and/or protein drinks per day.    -- In addition to your usual morning smoothly, you could have a second on daily or have a ready to drink nutrition drink/protein drink for your 2nd or 3rd option.      -- To get more calories in your smoothly you could add in some avocado or blend in some natural nut butter such as almond butter or peanut butter.    --If he did like to try a ready to drink option could try Ensure boost Premier protein or pure protein.  If you prefer a plant-based option, could try Evolve or Owyn or Ripple protein drinks.   - You could use the ready to drink protein drink and bland with fruits, vegetables, avocado and nut butters if you would like.    3. Drink 48 oz-64 oz or more water per day.    4. In general, recommendation is to wait at least 3 hours after eating before lying down and going to sleep.  If you are finding it actually is helpful to have a little something to eat before bed, you could try having a little Greek yogurt or cottage cheese as this may be something lighter but with some protein to help you feel satisfied longer.    5.  Okay to retry foods that you want to, to see if tolerated.      -Based on our conversation today, recommend incorporating fish, especially salmon, pork tenderloin for some additional protein sources.      -Can retry having salad as well.  If concerned about dressing, could top your salad with cottage cheese and spread as a  "dressing to salad.    -Can retry having tomatoes. Start with fresh tomatoes first to see if tolerated.     -when retrying foods, you can do it over a 3-day period, starting with just a small portion on day one (along with your usual foods), and if tolerated, then the next day can have a bigger portion. If tolerated, then on the 3rd day, can eat more of a normal portion.    6.  You could keep daily food and beverage journals to track intake to get an idea of how much you are eating and drinking daily. This may help you increase your calories and protein.    --You could use an selam such as \"My Fitness Pal\" or \"Lose It\" (this is okay for maintaining or gaining weight too these apps will calculate your intake for you.    Nutrition Monitoring and Evaluation: Will monitor adherence to nutrition recommendations at future RD visits.     Further Medical Nutrition Therapy:  Follow-up as needed.  She will plan to follow-up if she has any additional questions or concerns.    Patient was encouraged to contact RD with any further questions.    Farrah Resendez, MS, RD, LD    Note: this note was partially completed using voice dictation while checked for errors some grammatical/spelling errors may still be present.            "

## 2025-06-30 NOTE — LETTER
6/30/2025      Araceli KAUR Phillip  [address] [place] [address]      Dear Colleague,    Thank you for referring your patient, Araceli Fisher, to the [facility] [place]. Please see a copy of my visit note below.    Virtual Visit Details    Type of service:  Video Visit and telephone visit. Started visit as video, but pt's microphone not working so switched to telephone visit.    Originating Location (pt. Location): Home  Distant Location (provider location):  On-site  Platform used for Video Visit: [employer] and telephone    [facility] Outpatient Medical Nutrition Therapy      Time Spent: 44 minutes  Session Type:  Initial  Referring Physician:  Blessing Paez MD  Reason for RD Visit:   GERD with esophagitis and hemorrhage, gastritis without bleeding, esophagitis, weight loss     Nutrition Assessment:  Patient is a 30 year old female with history that includes GERD with esophagitis and hemorrhage, gastritis without bleeding, esophagitis, malnutrition, VA and unintentional weight loss.    She stated that prior to January she had not had GI issues before, but after New Year's she started having issues with epigastric pain, significant stomach pain and some difficulty swallowing.  She stated that she didn't specifically noticed heartburn issues. She especially has an issues after drinking a glass of wine where she was having significant issues, vomiting and had to go to hospital for fluids. She had an upper endoscopy and was told she had GERD with esophagitis and gastritis.  She took omeprazole for a couple of weeks, but did not realize she was supposed to stay on it for 3 months, so restarted and has been taking omeprazole now.  Additionally she has been following a GERD diet strictly, and with following the GERD diet recommendations as well as taking omeprazole she has not been having issues with pain or attacks, typically.  Sometimes with eating or directly after  "eating she will have some issues especially if she eats a foods such as onions.  She sometimes wakes up at night with stomach pain and was told possibly d/t not eating for awhile so has an evening snack such as some nuts and this seems to help. She has lost weight unintentionally due to symptoms and diet changes.  She does feel better on the diet, but losing weight.  She stated that she had been about 140 pounds, gotten down to 225 pounds last month and then weighed herself at home today and was 119 pounds.  She would like to prevent further weight loss, and reported that her close are all loose and fitting differently and people are noticing that she has been losing weight.  She stated that her  likes to follow more holistic type interventions, so occasionally she will have 1 to 2 ounces of aloe juice.  She has also been taking magnesium glycinate for energy and stress and feels that it is helpful.  She tried taking a multivitamin but did not tolerate it.  She is wondering if she will be able to retry foods or if she can retry some foods.  She has been sticking to more plain diet with chicken, turkey, eggs and cottage cheese to try to get more protein as well as eats yogurt some avocado and potato and rice.  She also makes a yogurt or Kefir smoothie with fruits and vegetables.  She tolerates salad she believes, but has not been eating a lot due to concern for putting dressing on her salad with concern they have vinegar and may cause more issues.  She has seen some ready-to-drink options at [facility] but was not sure if these were options for her.      Height:   Ht Readings from Last 1 Encounters:   06/30/25 1.676 m (5' 6\")     Weight:  Wt Readings from Last 10 Encounters:   06/30/25 54.4 kg (120 lb)   04/23/25 60.3 kg (133 lb)   04/17/25 60.3 kg (133 lb)   02/20/25 62.1 kg (136 lb 12.8 oz)   09/19/24 60.3 kg (133 lb)      BMI: Estimated body mass index is 19.37 kg/m  as calculated from the following:    Height " "as of this encounter: 1.676 m (5' 6\").    Weight as of this encounter: 54.4 kg (120 lb).    Diet Recall:  (some usual/recent meals/snacks/beverages): 3 main smaller meals and snacks throughout the day. She has been trying to get more protein. Prior to this was not a big meat person but now trying to eat some for more protein.  Meal Food    Breakfast Smoothie with glory, strawberry, banana, kefir or gr yogurt, coconut water, spinach, 2 eggs, ww/sourdough toast, cottage cheese     Lunch Chicken/turkey breast, baked, sweet potato, avocado, rice/br rice, cottage cheese     Dinner Similar to lunch     Snacks String cheese and low fat yogurt, perfect bar, nuts. Sometimes lidia candy     Beverages Water, sometimes peppermint or licorice tea.  likes holistic options so drinking some aloe juice (1-2 oz) but not lately.      Alcohol Intake In April drank a couple of beers and tolerated (while on vacation) but at home had glass of wine and had significant symptoms     Frequency of eating/taking out meals: not as much lately b/c can't find anything she tolerates. Last week did have rice, grilled chicken, tzaki, feta, but had issues afterwards an believes it may have been the feta and fat content.     Labs:    Last Comprehensive Metabolic Panel:  Sodium   Date Value Ref Range Status   04/17/2025 139 135 - 145 mmol/L Final     Potassium   Date Value Ref Range Status   04/17/2025 4.3 3.4 - 5.3 mmol/L Final     Chloride   Date Value Ref Range Status   04/17/2025 114 (H) 94 - 109 mmol/L Final     Carbon Dioxide (CO2)   Date Value Ref Range Status   04/17/2025 25 20 - 32 mmol/L Final     Anion Gap   Date Value Ref Range Status   04/17/2025 <1 (L) 3 - 14 mmol/L Final     Glucose   Date Value Ref Range Status   04/17/2025 105 (H) 70 - 99 mg/dL Final     Urea Nitrogen   Date Value Ref Range Status   04/17/2025 17 7 - 30 mg/dL Final     Creatinine   Date Value Ref Range Status   04/17/2025 0.60 0.52 - 1.04 mg/dL Final     GFR " Estimate   Date Value Ref Range Status   04/17/2025 >90 >60 mL/min/1.73m2 Final     Calcium   Date Value Ref Range Status   04/17/2025 9.5 8.5 - 10.1 mg/dL Final     CBC RESULTS:   Recent Labs   Lab Test 04/17/25  1031   WBC 9.5   RBC 5.02   HGB 14.3   HCT 44.3   MCV 88   MCH 28.5   MCHC 32.3   RDW 13.1          Pertinent Medications/vitamin and mineral supplements:   Reported taking magnesium glycinate.    Current Outpatient Medications   Medication Sig Dispense Refill     albuterol (PROAIR HFA/PROVENTIL HFA/VENTOLIN HFA) 108 (90 Base) MCG/ACT inhaler Inhale 2 puffs into the lungs every 6 hours as needed for shortness of breath, wheezing or cough. (Patient not taking: Reported on 6/30/2025) 18 g 1     omeprazole (PRILOSEC) 20 MG DR capsule Take 1 capsule (20 mg) by mouth daily. 90 capsule 1     ondansetron (ZOFRAN) 4 MG tablet Take 1 tablet (4 mg) by mouth every 8 hours as needed for nausea. 30 tablet 5     sertraline (ZOLOFT) 25 MG tablet Take 1 tablet (25 mg) by mouth daily. (Patient not taking: Reported on 6/30/2025) 90 tablet 1     Current Facility-Administered Medications   Medication Dose Route Frequency Provider Last Rate Last Admin     sodium chloride (PF) 0.9% PF flush 3 mL  3 mL Intravenous q1 min prn            Food Allergies:  NKFA    MALNUTRITION:  % Weight Loss:  > 10% in 6 months (severe malnutrition)  % Intake:  <75% for >/= 3 months (moderate malnutrition)  Subcutaneous Fat Loss: Unable to assess  Muscle Loss: Unable to assess  Fluid Retention:  None noted    Malnutrition Diagnosis: Moderate malnutrition  In Context of:  Acute illness or injury    Nutrition Prescription: Nutrition Education     Nutrition Intervention      Provided diet education for GERD with esophagitis and gastritis tips, also discussed tips for increasing calories and protein for unintentional weight loss and malnutrition.    Answered patient's questions. Patient verbalized understanding of education provided. See Goals  below.     Educational Materials Provided: Nutrition care manual: GERD nutrition therapy    Goals:    1. Aim for eating 5-6 smaller meals per day. Eat these smaller meals spread out throughout the day versus too much in a short period of time.    --Some potential food and beverage triggers for reflux include caffeine, carbonated beverages, alcoholic beverages, chocolate, peppermint, spearmint, black pepper, heavily spiced foods, high fats, eating large portions/large meals.    --For fruits and vegetables, this can be very individualized so if you tolerate certain options okay to continue to have them. Potentially citrus, tomato, tomato sauces, vinegars, onions and garlic are triggers but not for everyone so if you tolerate any then okay to continue eating them.    2.  Drink at least 2-3 smoothies and/or protein drinks per day.    -- In addition to your usual morning smoothly, you could have a second on daily or have a ready to drink nutrition drink/protein drink for your 2nd or 3rd option.      -- To get more calories in your smoothly you could add in some avocado or blend in some natural nut butter such as almond butter or peanut butter.    --If he did like to try a ready to drink option could try Ensure boost Premier protein or pure protein.  If you prefer a plant-based option, could try Evolve or Owyn or Ripple protein drinks.   - You could use the ready to drink protein drink and bland with fruits, vegetables, avocado and nut butters if you would like.    3. Drink 48 oz-64 oz or more water per day.    4. In general, recommendation is to wait at least 3 hours after eating before lying down and going to sleep.  If you are finding it actually is helpful to have a little something to eat before bed, you could try having a little Greek yogurt or cottage cheese as this may be something lighter but with some protein to help you feel satisfied longer.    5.  Okay to retry foods that you want to, to see if tolerated.   "    -Based on our conversation today, recommend incorporating fish, especially salmon, pork tenderloin for some additional protein sources.      -Can retry having salad as well.  If concerned about dressing, could top your salad with cottage cheese and spread as a dressing to salad.    -Can retry having tomatoes. Start with fresh tomatoes first to see if tolerated.     -when retrying foods, you can do it over a 3-day period, starting with just a small portion on day one (along with your usual foods), and if tolerated, then the next day can have a bigger portion. If tolerated, then on the 3rd day, can eat more of a normal portion.    6.  You could keep daily food and beverage journals to track intake to get an idea of how much you are eating and drinking daily. This may help you increase your calories and protein.    --You could use an selam such as \"My Fitness Pal\" or \"Lose It\" (this is okay for maintaining or gaining weight too these apps will calculate your intake for you.    Nutrition Monitoring and Evaluation: Will monitor adherence to nutrition recommendations at future RD visits.     Further Medical Nutrition Therapy:  Follow-up as needed.  She will plan to follow-up if she has any additional questions or concerns.    Patient was encouraged to contact RD with any further questions.    Farrah Resendez MS, RD, LD    Note: this note was partially completed using voice dictation while checked for errors some grammatical/spelling errors may still be present.              Again, thank you for allowing me to participate in the care of your patient.        Sincerely,        Farrah Resendez RD    Electronically signed"

## 2025-06-30 NOTE — PATIENT INSTRUCTIONS
It was nice speaking with you today. Below are the nutrition recommendations we discussed at your visit.    I will mail out a handout for you as well.    Please let me know if you have any additional questions.    Nutrition Recommendations    1. Aim for eating 5-6 smaller meals per day. Eat these smaller meals spread out throughout the day versus too much in a short period of time.    --Some potential food and beverage triggers for reflux include caffeine, carbonated beverages, alcoholic beverages, chocolate, peppermint, spearmint, black pepper, heavily spiced foods, high fats, eating large portions/large meals.    --For fruits and vegetables, this can be very individualized so if you tolerate certain options okay to continue to have them. Potentially citrus, tomato, tomato sauces, vinegars, onions and garlic are triggers but not for everyone so if you tolerate any then okay to continue eating them.    2.  Drink at least 2-3 smoothies and/or protein drinks per day.    -- In addition to your usual morning smoothly, you could have a second on daily or have a ready to drink nutrition drink/protein drink for your 2nd or 3rd option.      -- To get more calories in your smoothly you could add in some avocado or blend in some natural nut butter such as almond butter or peanut butter.    --If he did like to try a ready to drink option could try Ensure boost Premier protein or pure protein.  If you prefer a plant-based option, could try Evolve or Owyn or Ripple protein drinks.   - You could use the ready to drink protein drink and bland with fruits, vegetables, avocado and nut butters if you would like.    3. Drink 48 oz-64 oz or more water per day.    4. In general, recommendation is to wait at least 3 hours after eating before lying down and going to sleep.  If you are finding it actually is helpful to have a little something to eat before bed, you could try having a little Greek yogurt or cottage cheese as this may be  "something lighter but with some protein to help you feel satisfied longer.    5.  Okay to retry foods that you want to, to see if tolerated.      -Based on our conversation today, recommend incorporating fish, especially salmon, pork tenderloin for some additional protein sources.      -Can retry having salad as well.  If concerned about dressing, could top your salad with cottage cheese and spread as a dressing to salad.    -Can retry having tomatoes. Start with fresh tomatoes first to see if tolerated.     -when retrying foods, you can do it over a 3-day period, starting with just a small portion on day one (along with your usual foods), and if tolerated, then the next day can have a bigger portion. If tolerated, then on the 3rd day, can eat more of a normal portion.    6.  You could keep daily food and beverage journals to track intake to get an idea of how much you are eating and drinking daily. This may help you increase your calories and protein.    --You could use an selam such as \"My Fitness Pal\" or \"Lose It\" (this is okay for maintaining or gaining weight too these apps will calculate your intake for you.    Can follow up as needed.    If you would like to schedule a follow up appointment, please call [phone].    Thank you,    Farrah Resendez, MS, RD, LD      "

## 2025-08-13 ENCOUNTER — VIRTUAL VISIT (OUTPATIENT)
Dept: FAMILY MEDICINE | Facility: CLINIC | Age: 30
End: 2025-08-13
Payer: COMMERCIAL

## 2025-08-13 DIAGNOSIS — F41.1 GAD (GENERALIZED ANXIETY DISORDER): ICD-10-CM

## 2025-08-13 DIAGNOSIS — R63.4 LOSS OF WEIGHT: ICD-10-CM

## 2025-08-13 DIAGNOSIS — K29.70 GASTRITIS WITHOUT BLEEDING, UNSPECIFIED CHRONICITY, UNSPECIFIED GASTRITIS TYPE: ICD-10-CM

## 2025-08-13 DIAGNOSIS — K21.01 GASTROESOPHAGEAL REFLUX DISEASE WITH ESOPHAGITIS AND HEMORRHAGE: Primary | ICD-10-CM

## 2025-08-13 DIAGNOSIS — K20.90 ESOPHAGITIS: ICD-10-CM

## 2025-08-13 PROCEDURE — 98006 SYNCH AUDIO-VIDEO EST MOD 30: CPT | Performed by: INTERNAL MEDICINE

## 2025-08-13 RX ORDER — HYDROXYZINE HYDROCHLORIDE 25 MG/1
25 TABLET, FILM COATED ORAL 3 TIMES DAILY PRN
Qty: 30 TABLET | Refills: 1 | Status: SHIPPED | OUTPATIENT
Start: 2025-08-13

## 2025-08-13 RX ORDER — PANTOPRAZOLE SODIUM 20 MG/1
20 TABLET, DELAYED RELEASE ORAL DAILY
Qty: 90 TABLET | Refills: 1 | Status: SHIPPED | OUTPATIENT
Start: 2025-08-13

## 2025-08-13 ASSESSMENT — ASTHMA QUESTIONNAIRES
QUESTION_4 LAST FOUR WEEKS HOW OFTEN HAVE YOU USED YOUR RESCUE INHALER OR NEBULIZER MEDICATION (SUCH AS ALBUTEROL): NOT AT ALL
QUESTION_5 LAST FOUR WEEKS HOW WOULD YOU RATE YOUR ASTHMA CONTROL: COMPLETELY CONTROLLED
QUESTION_1 LAST FOUR WEEKS HOW MUCH OF THE TIME DID YOUR ASTHMA KEEP YOU FROM GETTING AS MUCH DONE AT WORK, SCHOOL OR AT HOME: NONE OF THE TIME
QUESTION_3 LAST FOUR WEEKS HOW OFTEN DID YOUR ASTHMA SYMPTOMS (WHEEZING, COUGHING, SHORTNESS OF BREATH, CHEST TIGHTNESS OR PAIN) WAKE YOU UP AT NIGHT OR EARLIER THAN USUAL IN THE MORNING: NOT AT ALL
QUESTION_2 LAST FOUR WEEKS HOW OFTEN HAVE YOU HAD SHORTNESS OF BREATH: NOT AT ALL
ACT_TOTALSCORE: 25
ACT_TOTALSCORE: 25

## (undated) RX ORDER — FENTANYL CITRATE 50 UG/ML
INJECTION, SOLUTION INTRAMUSCULAR; INTRAVENOUS
Status: DISPENSED
Start: 2025-05-07

## (undated) RX ORDER — ONDANSETRON 2 MG/ML
INJECTION INTRAMUSCULAR; INTRAVENOUS
Status: DISPENSED
Start: 2025-05-07

## (undated) RX ORDER — DIPHENHYDRAMINE HYDROCHLORIDE 50 MG/ML
INJECTION, SOLUTION INTRAMUSCULAR; INTRAVENOUS
Status: DISPENSED
Start: 2025-05-07